# Patient Record
Sex: MALE | Race: WHITE | URBAN - METROPOLITAN AREA
[De-identification: names, ages, dates, MRNs, and addresses within clinical notes are randomized per-mention and may not be internally consistent; named-entity substitution may affect disease eponyms.]

---

## 2018-06-28 ENCOUNTER — INPATIENT (INPATIENT)
Facility: HOSPITAL | Age: 33
LOS: 4 days | Discharge: HOME | End: 2018-07-03
Attending: INTERNAL MEDICINE | Admitting: INTERNAL MEDICINE

## 2018-06-28 VITALS
SYSTOLIC BLOOD PRESSURE: 166 MMHG | WEIGHT: 179.9 LBS | HEART RATE: 82 BPM | DIASTOLIC BLOOD PRESSURE: 88 MMHG | RESPIRATION RATE: 18 BRPM | OXYGEN SATURATION: 98 % | TEMPERATURE: 99 F | HEIGHT: 67 IN

## 2018-06-28 DIAGNOSIS — F10.10 ALCOHOL ABUSE, UNCOMPLICATED: ICD-10-CM

## 2018-06-28 DIAGNOSIS — K85.20 ALCOHOL INDUCED ACUTE PANCREATITIS WITHOUT NECROSIS OR INFECTION: ICD-10-CM

## 2018-06-28 LAB
ALBUMIN SERPL ELPH-MCNC: 4.9 G/DL — SIGNIFICANT CHANGE UP (ref 3.5–5.2)
ALP SERPL-CCNC: 64 U/L — SIGNIFICANT CHANGE UP (ref 30–115)
ALT FLD-CCNC: 33 U/L — SIGNIFICANT CHANGE UP (ref 0–41)
ANION GAP SERPL CALC-SCNC: 18 MMOL/L — HIGH (ref 7–14)
APPEARANCE UR: CLEAR — SIGNIFICANT CHANGE UP
AST SERPL-CCNC: 41 U/L — SIGNIFICANT CHANGE UP (ref 0–41)
BACTERIA # UR AUTO: (no result)
BASOPHILS # BLD AUTO: 0.08 K/UL — SIGNIFICANT CHANGE UP (ref 0–0.2)
BASOPHILS NFR BLD AUTO: 0.5 % — SIGNIFICANT CHANGE UP (ref 0–1)
BILIRUB DIRECT SERPL-MCNC: 0.2 MG/DL — SIGNIFICANT CHANGE UP (ref 0–0.2)
BILIRUB INDIRECT FLD-MCNC: 0.5 MG/DL — SIGNIFICANT CHANGE UP (ref 0.2–1.2)
BILIRUB SERPL-MCNC: 0.7 MG/DL — SIGNIFICANT CHANGE UP (ref 0.2–1.2)
BILIRUB UR-MCNC: (no result)
BUN SERPL-MCNC: 10 MG/DL — SIGNIFICANT CHANGE UP (ref 10–20)
CALCIUM SERPL-MCNC: 9.9 MG/DL — SIGNIFICANT CHANGE UP (ref 8.5–10.1)
CHLORIDE SERPL-SCNC: 98 MMOL/L — SIGNIFICANT CHANGE UP (ref 98–110)
CK MB CFR SERPL CALC: 3.7 NG/ML — SIGNIFICANT CHANGE UP (ref 0.6–6.3)
CK SERPL-CCNC: 245 U/L — HIGH (ref 0–225)
CO2 SERPL-SCNC: 26 MMOL/L — SIGNIFICANT CHANGE UP (ref 17–32)
COLOR SPEC: YELLOW — SIGNIFICANT CHANGE UP
CREAT SERPL-MCNC: 1.3 MG/DL — SIGNIFICANT CHANGE UP (ref 0.7–1.5)
DIFF PNL FLD: NEGATIVE — SIGNIFICANT CHANGE UP
EOSINOPHIL # BLD AUTO: 0.22 K/UL — SIGNIFICANT CHANGE UP (ref 0–0.7)
EOSINOPHIL NFR BLD AUTO: 1.3 % — SIGNIFICANT CHANGE UP (ref 0–8)
GLUCOSE SERPL-MCNC: 98 MG/DL — SIGNIFICANT CHANGE UP (ref 70–99)
GLUCOSE UR QL: NEGATIVE — SIGNIFICANT CHANGE UP
HCT VFR BLD CALC: 47.3 % — SIGNIFICANT CHANGE UP (ref 42–52)
HGB BLD-MCNC: 16.5 G/DL — SIGNIFICANT CHANGE UP (ref 14–18)
IMM GRANULOCYTES NFR BLD AUTO: 0.6 % — HIGH (ref 0.1–0.3)
KETONES UR-MCNC: 80 — SIGNIFICANT CHANGE UP
LACTATE SERPL-SCNC: 1 MMOL/L — SIGNIFICANT CHANGE UP (ref 0.5–2.2)
LEUKOCYTE ESTERASE UR-ACNC: NEGATIVE — SIGNIFICANT CHANGE UP
LIDOCAIN IGE QN: >300 U/L — HIGH (ref 7–60)
LYMPHOCYTES # BLD AUTO: 12 % — LOW (ref 20.5–51.1)
LYMPHOCYTES # BLD AUTO: 2.11 K/UL — SIGNIFICANT CHANGE UP (ref 1.2–3.4)
MAGNESIUM SERPL-MCNC: 2 MG/DL — SIGNIFICANT CHANGE UP (ref 1.8–2.4)
MCHC RBC-ENTMCNC: 31.3 PG — HIGH (ref 27–31)
MCHC RBC-ENTMCNC: 34.9 G/DL — SIGNIFICANT CHANGE UP (ref 32–37)
MCV RBC AUTO: 89.6 FL — SIGNIFICANT CHANGE UP (ref 80–94)
MONOCYTES # BLD AUTO: 1.68 K/UL — HIGH (ref 0.1–0.6)
MONOCYTES NFR BLD AUTO: 9.6 % — HIGH (ref 1.7–9.3)
NEUTROPHILS # BLD AUTO: 13.36 K/UL — HIGH (ref 1.4–6.5)
NEUTROPHILS NFR BLD AUTO: 76 % — HIGH (ref 42.2–75.2)
NITRITE UR-MCNC: NEGATIVE — SIGNIFICANT CHANGE UP
NRBC # BLD: 0 /100 WBCS — SIGNIFICANT CHANGE UP (ref 0–0)
PH UR: 6 — SIGNIFICANT CHANGE UP (ref 5–8)
PLATELET # BLD AUTO: 313 K/UL — SIGNIFICANT CHANGE UP (ref 130–400)
POTASSIUM SERPL-MCNC: 4.2 MMOL/L — SIGNIFICANT CHANGE UP (ref 3.5–5)
POTASSIUM SERPL-SCNC: 4.2 MMOL/L — SIGNIFICANT CHANGE UP (ref 3.5–5)
PROT SERPL-MCNC: 7.4 G/DL — SIGNIFICANT CHANGE UP (ref 6–8)
PROT UR-MCNC: 30
RBC # BLD: 5.28 M/UL — SIGNIFICANT CHANGE UP (ref 4.7–6.1)
RBC # FLD: 13.4 % — SIGNIFICANT CHANGE UP (ref 11.5–14.5)
RBC CASTS # UR COMP ASSIST: SIGNIFICANT CHANGE UP /HPF
SODIUM SERPL-SCNC: 142 MMOL/L — SIGNIFICANT CHANGE UP (ref 135–146)
SP GR SPEC: 1.01 — SIGNIFICANT CHANGE UP (ref 1.01–1.03)
TROPONIN T SERPL-MCNC: <0.01 NG/ML — SIGNIFICANT CHANGE UP
UROBILINOGEN FLD QL: 0.2 — SIGNIFICANT CHANGE UP (ref 0.2–0.2)
WBC # BLD: 17.55 K/UL — HIGH (ref 4.8–10.8)
WBC # FLD AUTO: 17.55 K/UL — HIGH (ref 4.8–10.8)
WBC UR QL: SIGNIFICANT CHANGE UP /HPF

## 2018-06-28 RX ORDER — MORPHINE SULFATE 50 MG/1
6 CAPSULE, EXTENDED RELEASE ORAL ONCE
Qty: 0 | Refills: 0 | Status: DISCONTINUED | OUTPATIENT
Start: 2018-06-28 | End: 2018-06-28

## 2018-06-28 RX ORDER — FOLIC ACID 0.8 MG
1 TABLET ORAL DAILY
Qty: 0 | Refills: 0 | Status: DISCONTINUED | OUTPATIENT
Start: 2018-06-28 | End: 2018-07-03

## 2018-06-28 RX ORDER — THIAMINE MONONITRATE (VIT B1) 100 MG
100 TABLET ORAL DAILY
Qty: 0 | Refills: 0 | Status: DISCONTINUED | OUTPATIENT
Start: 2018-06-29 | End: 2018-07-03

## 2018-06-28 RX ORDER — HEPARIN SODIUM 5000 [USP'U]/ML
5000 INJECTION INTRAVENOUS; SUBCUTANEOUS EVERY 8 HOURS
Qty: 0 | Refills: 0 | Status: DISCONTINUED | OUTPATIENT
Start: 2018-06-28 | End: 2018-07-03

## 2018-06-28 RX ORDER — ONDANSETRON 8 MG/1
4 TABLET, FILM COATED ORAL EVERY 8 HOURS
Qty: 0 | Refills: 0 | Status: DISCONTINUED | OUTPATIENT
Start: 2018-06-28 | End: 2018-07-03

## 2018-06-28 RX ORDER — KETOROLAC TROMETHAMINE 30 MG/ML
30 SYRINGE (ML) INJECTION ONCE
Qty: 0 | Refills: 0 | Status: DISCONTINUED | OUTPATIENT
Start: 2018-06-28 | End: 2018-06-28

## 2018-06-28 RX ORDER — IBUPROFEN 200 MG
400 TABLET ORAL EVERY 6 HOURS
Qty: 0 | Refills: 0 | Status: DISCONTINUED | OUTPATIENT
Start: 2018-06-28 | End: 2018-06-28

## 2018-06-28 RX ORDER — THIAMINE MONONITRATE (VIT B1) 100 MG
100 TABLET ORAL ONCE
Qty: 0 | Refills: 0 | Status: COMPLETED | OUTPATIENT
Start: 2018-06-28 | End: 2018-06-29

## 2018-06-28 RX ORDER — SODIUM CHLORIDE 9 MG/ML
1000 INJECTION INTRAMUSCULAR; INTRAVENOUS; SUBCUTANEOUS
Qty: 0 | Refills: 0 | Status: DISCONTINUED | OUTPATIENT
Start: 2018-06-28 | End: 2018-06-29

## 2018-06-28 RX ORDER — HYDROMORPHONE HYDROCHLORIDE 2 MG/ML
1 INJECTION INTRAMUSCULAR; INTRAVENOUS; SUBCUTANEOUS
Qty: 0 | Refills: 0 | Status: DISCONTINUED | OUTPATIENT
Start: 2018-06-28 | End: 2018-07-03

## 2018-06-28 RX ORDER — MORPHINE SULFATE 50 MG/1
4 CAPSULE, EXTENDED RELEASE ORAL EVERY 4 HOURS
Qty: 0 | Refills: 0 | Status: DISCONTINUED | OUTPATIENT
Start: 2018-06-28 | End: 2018-06-28

## 2018-06-28 RX ORDER — ONDANSETRON 8 MG/1
4 TABLET, FILM COATED ORAL ONCE
Qty: 0 | Refills: 0 | Status: COMPLETED | OUTPATIENT
Start: 2018-06-28 | End: 2018-06-28

## 2018-06-28 RX ORDER — HYDROMORPHONE HYDROCHLORIDE 2 MG/ML
1 INJECTION INTRAMUSCULAR; INTRAVENOUS; SUBCUTANEOUS ONCE
Qty: 0 | Refills: 0 | Status: DISCONTINUED | OUTPATIENT
Start: 2018-06-28 | End: 2018-06-28

## 2018-06-28 RX ORDER — MORPHINE SULFATE 50 MG/1
2 CAPSULE, EXTENDED RELEASE ORAL EVERY 6 HOURS
Qty: 0 | Refills: 0 | Status: DISCONTINUED | OUTPATIENT
Start: 2018-06-28 | End: 2018-06-28

## 2018-06-28 RX ORDER — HYDROMORPHONE HYDROCHLORIDE 2 MG/ML
0.5 INJECTION INTRAMUSCULAR; INTRAVENOUS; SUBCUTANEOUS ONCE
Qty: 0 | Refills: 0 | Status: DISCONTINUED | OUTPATIENT
Start: 2018-06-28 | End: 2018-06-28

## 2018-06-28 RX ORDER — SODIUM CHLORIDE 9 MG/ML
1000 INJECTION INTRAMUSCULAR; INTRAVENOUS; SUBCUTANEOUS
Qty: 0 | Refills: 0 | Status: DISCONTINUED | OUTPATIENT
Start: 2018-06-28 | End: 2018-06-28

## 2018-06-28 RX ORDER — SODIUM CHLORIDE 9 MG/ML
2000 INJECTION INTRAMUSCULAR; INTRAVENOUS; SUBCUTANEOUS ONCE
Qty: 0 | Refills: 0 | Status: COMPLETED | OUTPATIENT
Start: 2018-06-28 | End: 2018-06-28

## 2018-06-28 RX ADMIN — MORPHINE SULFATE 4 MILLIGRAM(S): 50 CAPSULE, EXTENDED RELEASE ORAL at 13:58

## 2018-06-28 RX ADMIN — HYDROMORPHONE HYDROCHLORIDE 1 MILLIGRAM(S): 2 INJECTION INTRAMUSCULAR; INTRAVENOUS; SUBCUTANEOUS at 10:46

## 2018-06-28 RX ADMIN — HYDROMORPHONE HYDROCHLORIDE 1 MILLIGRAM(S): 2 INJECTION INTRAMUSCULAR; INTRAVENOUS; SUBCUTANEOUS at 22:42

## 2018-06-28 RX ADMIN — HYDROMORPHONE HYDROCHLORIDE 1 MILLIGRAM(S): 2 INJECTION INTRAMUSCULAR; INTRAVENOUS; SUBCUTANEOUS at 19:40

## 2018-06-28 RX ADMIN — HYDROMORPHONE HYDROCHLORIDE 0.5 MILLIGRAM(S): 2 INJECTION INTRAMUSCULAR; INTRAVENOUS; SUBCUTANEOUS at 09:36

## 2018-06-28 RX ADMIN — ONDANSETRON 4 MILLIGRAM(S): 8 TABLET, FILM COATED ORAL at 08:21

## 2018-06-28 RX ADMIN — SODIUM CHLORIDE 200 MILLILITER(S): 9 INJECTION INTRAMUSCULAR; INTRAVENOUS; SUBCUTANEOUS at 14:56

## 2018-06-28 RX ADMIN — HYDROMORPHONE HYDROCHLORIDE 1 MILLIGRAM(S): 2 INJECTION INTRAMUSCULAR; INTRAVENOUS; SUBCUTANEOUS at 23:00

## 2018-06-28 RX ADMIN — MORPHINE SULFATE 2 MILLIGRAM(S): 50 CAPSULE, EXTENDED RELEASE ORAL at 12:51

## 2018-06-28 RX ADMIN — HYDROMORPHONE HYDROCHLORIDE 0.5 MILLIGRAM(S): 2 INJECTION INTRAMUSCULAR; INTRAVENOUS; SUBCUTANEOUS at 10:50

## 2018-06-28 RX ADMIN — Medication 30 MILLIGRAM(S): at 08:21

## 2018-06-28 RX ADMIN — ONDANSETRON 4 MILLIGRAM(S): 8 TABLET, FILM COATED ORAL at 22:42

## 2018-06-28 RX ADMIN — HYDROMORPHONE HYDROCHLORIDE 1 MILLIGRAM(S): 2 INJECTION INTRAMUSCULAR; INTRAVENOUS; SUBCUTANEOUS at 16:04

## 2018-06-28 RX ADMIN — MORPHINE SULFATE 4 MILLIGRAM(S): 50 CAPSULE, EXTENDED RELEASE ORAL at 14:50

## 2018-06-28 RX ADMIN — Medication 30 MILLIGRAM(S): at 09:22

## 2018-06-28 RX ADMIN — Medication 0.5 MILLIGRAM(S): at 10:50

## 2018-06-28 RX ADMIN — HYDROMORPHONE HYDROCHLORIDE 1 MILLIGRAM(S): 2 INJECTION INTRAMUSCULAR; INTRAVENOUS; SUBCUTANEOUS at 16:30

## 2018-06-28 RX ADMIN — Medication 2 MILLIGRAM(S): at 21:25

## 2018-06-28 RX ADMIN — SODIUM CHLORIDE 1000 MILLILITER(S): 9 INJECTION INTRAMUSCULAR; INTRAVENOUS; SUBCUTANEOUS at 08:22

## 2018-06-28 RX ADMIN — Medication 2 MILLIGRAM(S): at 17:29

## 2018-06-28 RX ADMIN — HYDROMORPHONE HYDROCHLORIDE 1 MILLIGRAM(S): 2 INJECTION INTRAMUSCULAR; INTRAVENOUS; SUBCUTANEOUS at 19:55

## 2018-06-28 RX ADMIN — MORPHINE SULFATE 2 MILLIGRAM(S): 50 CAPSULE, EXTENDED RELEASE ORAL at 13:32

## 2018-06-28 RX ADMIN — SODIUM CHLORIDE 250 MILLILITER(S): 9 INJECTION INTRAMUSCULAR; INTRAVENOUS; SUBCUTANEOUS at 22:42

## 2018-06-28 NOTE — H&P ADULT - NSHPREVIEWOFSYSTEMS_GEN_ALL_CORE
no fever os chills or cough  abd pain no diarrhea  no chest pain or shortness of breath  no other concerns

## 2018-06-28 NOTE — CONSULT NOTE ADULT - ASSESSMENT
1. Pancreatitis   - GI fu   - surgical fu   - NPO except clears   - c/w dilaudid it helps pt   - c/w ativan   - thiamine folate for etoh abuse   - monitor for DTs   - would consider antibiotics if wbc increasing   - DVT ppx     family at bedside

## 2018-06-28 NOTE — H&P ADULT - NSHPLABSRESULTS_GEN_ALL_CORE
16.5   17.55 )-----------( 313      ( 2018 08:12 )             47.3           142  |  98  |  10  ----------------------------<  98  4.2   |  26  |  1.3    Ca    9.9      2018 08:12    TPro  7.4  /  Alb  4.9  /  TBili  0.7  /  DBili  0.2  /  AST  41  /  ALT  33  /  AlkPhos  64                    Urinalysis Basic - ( 2018 08:12 )    Color: Yellow / Appearance: Clear / S.015 / pH: x  Gluc: x / Ketone: 80  / Bili: Small / Urobili: 0.2   Blood: x / Protein: 30 / Nitrite: Negative   Leuk Esterase: Negative / RBC: 1-2 /HPF / WBC 1-2 /HPF   Sq Epi: x / Non Sq Epi: x / Bacteria: Few            Lactate Trend   @ 08:12 Lactate:1.0       CARDIAC MARKERS ( 2018 08:12 )  x     / <0.01 ng/mL / 245 U/L / x     / x      CARDIAC MARKERS ( 2018 08:00 )  x     / x     / x     / x     / 3.7 ng/mL

## 2018-06-28 NOTE — H&P ADULT - ASSESSMENT
DX; abdominal pain        pancreatitis        alcohol abuse  A/P;admit to med-surg       NPO       IV HYDRATION  labs/ecg/c-xray  RUQ US  pain mgmt  GI consult  Detox consult  Librium prn  monitor vss  monitor pt

## 2018-06-28 NOTE — ED PROVIDER NOTE - PHYSICAL EXAMINATION
VITAL SIGNS: I have reviewed nursing notes and confirm.  CONSTITUTIONAL: Well-developed; well-nourished; in no acute distress. pt very uncomfortable  SKIN: skin exam is warm and dry, no acute rash.   HEAD: Normocephalic; atraumatic.  EYES:  EOM intact; conjunctiva and sclera clear.  ENT: No nasal discharge; airway clear. moist oral mucosa;   NECK: Supple; non tender.  CARD: S1, S2 normal; no murmurs, gallops, or rubs. Regular rate and rhythm. posterior tibial and radial pulses 2+  RESP: No wheezes, rales or rhonchi. cta b/l. no use of accessory muscles. no retractions  ABD: Normal bowel sounds; soft; non-distended; tenderness to abdomen throughout-- concentrated to epigastric area.   EXT: Normal ROM. No  cyanosis or edema.  BACK: No cva tenderness  LYMPH: No acute cervical adenopathy.  NEURO: Alert, oriented, grossly unremarkable.    PSYCH: Cooperative, appropriate.

## 2018-06-28 NOTE — H&P ADULT - HISTORY OF PRESENT ILLNESS
32y old male pmhx below presents to the ED complaining of sudden onset of constant sharp like epigastric abdominal pain scale 10/10 since yesterday radiating to the back and associated with n/v x1 today. pain midly relieved with aleve. pt denies chest pain, sob, headache, fever/ chills, blackouts or seizures. pt admits to current and continous use of alcohol - 2pints vodka/day x5yrs. last use yesterday. pt also states hx of opiods abuse - oxycodone pills. last use 10yrs ago. pt currently on suboxone

## 2018-06-28 NOTE — H&P ADULT - NSHPSOCIALHISTORY_GEN_ALL_CORE
lives at home  has 6 year gorlfried  + etoh  + smoking  + hx of oxycodone but no recent. o n suboxone

## 2018-06-28 NOTE — H&P ADULT - ATTENDING COMMENTS
31 yo male with pmh that includes continuous alcohol dependence, continuous opiate dependence, last admitted in 2014 for alcoholic pancreatitis, who presented with abdominal pain. Sts drinks daily, has hx of 'the shakes' when does not drink, and developed abdominal pain yesterday, epigastric and radiating to the back. thus came to the ER today. He was assessed in the ER and found with uncontrolled HTN, leukocytosis, no fevers, a CT showed acute pancreatitis and a RUQ sono did not show gallstone. admitted for further managment    On the unit, he states his pain is not controlled with the morphie,m but the dilaudid controlled t well bedfore. He is much more comfortable after 1 mg iv dilaudid.     His CIWA score after the dilaudid is    above info from PA reviewed with patient and his grlfriend Alexa at the bedside and modified prn    PLAN ADDENDED AS BELOW:    # Acute pancreatitis, without necrosis. with leukocytosi but no fever  - NPO  - IVF  ml /hr      Cr is mildly elevated    31 yo male with pmh that includes continuous alcohol dependence, continuous opiate dependence, last admitted in 2014 for alcoholic pancreatitis, who presented with abdominal pain. Sts drinks daily, has hx of 'the shakes' when does not drink, and developed abdominal pain yesterday, epigastric and radiating to the back. thus came to the ER today. He was assessed in the ER and found with uncontrolled HTN, leukocytosis, no fevers, a CT showed acute pancreatitis and a RUQ sono did not show gallstone. admitted for further managment    On the unit, he states his pain is not controlled with the morphie,m but the dilaudid controlled t well bedfore. He is much more comfortable after 1 mg iv dilaudid.     His CIWA score after the dilaudid is        · Assessment	  DX; abdominal pain        pancreatitis        alcohol abuse  A/P;admit to med-surg       NPO       IV HYDRATION  labs/ecg/c-xray  RUQ US  pain mgmt  GI consult  Detox consult  Librium prn  monitor vss  monitor pt       Attending Statement:  Plan discussed with DR STALLINGS 31 yo male with pmh that includes continuous alcohol dependence, continuous opiate dependence, last admitted in 2014 for alcoholic pancreatitis, who presented with abdominal pain. Sts drinks daily, has hx of 'the shakes' when does not drink, and developed abdominal pain yesterday, epigastric and radiating to the back. thus came to the ER today. He was assessed in the ER and found with uncontrolled HTN, leukocytosis, no fevers, a CT showed acute pancreatitis and a RUQ sono did not show gallstone. admitted for further managment    On the unit, he states his pain is not controlled with the morphie,m but the dilaudid controlled t well bedfore. He is much more comfortable after 1 mg iv dilaudid.     His CIWA score after the dilaudid is    above info from PA reviewed with patient and his grlfriend Alexa at the bedside and modified prn    PLAN ADDENDED AS BELOW:    # Acute pancreatitis, without necrosis. with leukocytosi but no fever  - NPO  - IVF  ml /hr  - trend vitals frequently, leukocytosi  - GI will see as well  - dilaudid seems to have worked better than morphine, rx dilaudid 1mg Iv q4 hrs prn and titrate as needed  - hold home suboxone for now while needs iv opiates      # Continuous alcohol dependence: when arrived to floor was restless. pain vs withdrawal. morhine did not help but dilaidid did.   ---- CIWA score after the dilaudid is  ---- Rx  - addiction medicine consult      # Cr is mildly elevated: trend with IVF likely mild prerenal VALERIE 31 yo male with pmh that includes continuous alcohol dependence, continuous opiate dependence, last admitted in 2014 for alcoholic pancreatitis, who presented with abdominal pain. Sts drinks daily, has hx of 'the shakes' when does not drink, and developed abdominal pain yesterday, epigastric and radiating to the back. thus came to the ER today. He was assessed in the ER and found with uncontrolled HTN, leukocytosis, no fevers, a CT showed acute pancreatitis and a RUQ sono did not show gallstone. admitted for further management    On the unit, he states his pain is not controlled with the morphine,m but the dilaudid controlled t well bedfore. He is much more comfortable after 1 mg iv dilaudid.     His CIWA score after the dilaudid is 9    on exam he is restless. no rsp distress. s1 s2 RRR. CTA BL lungs. abd soft tender epigastrically + BS. E no edema BLE    above info from PA reviewed with patient and his girlfriend Alexa at the bedside and modified prn    PLAN ADDENDED AS BELOW:    # Acute pancreatitis, without necrosis. with leukocytosis but no fever  - NPO  - IVF  ml /hr  - strict i/o  - trend vitals frequently, leukocytosis  - seen by GI who agreed with management but recommneded ICU screen, will call intensivist  - dilaudid seems to have worked better than morphine, rx dilaudid 1mg Iv q3 hrs prn and titrate as needed  - hold home suboxone for now while needs iv opiates      # Continuous alcohol dependence: when arrived to floor was restless. pain vs withdrawal. morhine did not help but dilaidid did.   - CIWA score after the dilaudid is still elevated at 9  - d/w detox MD, will start ativan protocol  - addiction medicine consult to woody follow      # Cr is mildly elevated: trend with IVF likely mild prerenal VALERIE    # HSQ ppx    # plan reviewer with patient, ximena, ej, rn

## 2018-06-28 NOTE — CONSULT NOTE ADULT - SUBJECTIVE AND OBJECTIVE BOX
This is a   admitted with  PANCREATITIS    for whom theGastroenterology Service is  consulted for :  33 year old male patient with known history of alcohol abuse, hx of acute pancreatitis admitted with pain and vomiting x 1 day duration. had been drinking alcohol till day prior          Current Health Issues  PANCREATITIS  ^PANCREATITIS  No pertinent family history in first degree relatives  Handoff  MEWS Score  ETOH abuse  Pancreatitis  No significant past surgical history  ABD PAIN (PANCREATITIS)  90+  ETOH abuse          No Known Allergies          Home Medications:  Suboxone: 3 milligram(s) sublingual (28 Jun 2018 14:38)          FAMILY HISTORY:  No pertinent family history in first degree relatives      REVIEW OF SYSTEMS      General: Nil	    Skin/Breast:  	  Ophthalmologic:  	  ENMT:	    Respiratory and Thorax:Nil	    	  Cardiovascular:	Nil	      Gastrointestinal:	    Genitourinary:	    Musculoskeletal:	Nil	      Neurological:	    Psychiatric:	    Hematology/Lymphatics:	    Endocrine:	    Allergic/Immunologic:	      PHYSICAL EXAM:    GENERAL:   in no distress    T(C): 36.9 (06-28-18 @ 16:35), Max: 37.1 (06-28-18 @ 07:52)  HR: 76 (06-28-18 @ 16:35) (67 - 82)  BP: 159/78 (06-28-18 @ 16:35) (153/100 - 177/93)  RR: 18 (06-28-18 @ 16:35) (18 - 22)  SpO2: 100% (06-28-18 @ 12:48) (97% - 100%)      HEENT:  NC/AT,  anicteric  CHEST:   no increased effort, breath sounds clear  HEART:  Regular rate and  rhythm  ABDOMEN:  Soft, but tender, non-distended, normoactive bowel sounds,  no masses ,no hepato-splenomegaly, no signs of chronic liver disease  EXTREMETIES :  no clubbing cyanosis or edema    MICROBIOLOGY      DATA  CBC Full  -  ( 28 Jun 2018 08:12 )  WBC Count : 17.55 K/uL  Hemoglobin : 16.5 g/dL  Hematocrit : 47.3 %  Platelet Count - Automated : 313 K/uL  Mean Cell Volume : 89.6 fL  Mean Cell Hemoglobin : 31.3 pg  Mean Cell Hemoglobin Concentration : 34.9 g/dL  Auto Neutrophil # : 13.36 K/uL  Auto Lymphocyte # : 2.11 K/uL  Auto Monocyte # : 1.68 K/uL  Auto Eosinophil # : 0.22 K/uL  Auto Basophil # : 0.08 K/uL  Auto Neutrophil % : 76.0 %  Auto Lymphocyte % : 12.0 %  Auto Monocyte % : 9.6 %  Auto Eosinophil % : 1.3 %  Auto Basophil % : 0.5 %          06-28  <  142  |  98  |  10  ----------------------------<  98  4.2   |  26  |  1.3    Ca    9.9      28 Jun 2018 08:12    TPro  7.4  /  Alb  4.9  /  TBili  0.7  /  DBili  0.2  /  AST  41  /  ALT  33  /  AlkPhos  64  06-28              AMYLASE                  06-28 @ 08:12   --  LIPASE                   06-28 @ 08:12  904  HCG  --                    06-28 @ 08:12     from: CT Abdomen and Pelvis w/ IV Cont (06.28.18 @ 09:02) >  Extensive peripancreatic fat stranding and fluid compatible   with pancreatitis. No peripancreatic fluid collections. Normal pancreatic   enhancement. Patent splenic vein.      < end of copied text >  < from: US Abdomen Limited (06.28.18 @ 12:39) >  GALLBLADDER/BILIARY TREE: There are no gallstones. There is no   gallbladder wall thickening. There is no pericholecystic fluid. The   common duct measures 6 mm.     < end of copied text >

## 2018-06-28 NOTE — CONSULT NOTE ADULT - SUBJECTIVE AND OBJECTIVE BOX
32 year old man with pancreatitis     pt seen and examined at bedside, pt resting comfortably with family asking to drink a carbonated bevarge.     PE vss  general: awake   chest: cta b/l  cvs: s1s2+  abd: diffuse tenderness  ext: no edema

## 2018-06-28 NOTE — H&P ADULT - NSHPPHYSICALEXAM_GEN_ALL_CORE
PHYSICAL EXAM:  GENERAL: NAD, well-developed, well nourished, looks stated age  HEAD:  Atraumatic, Normocephalic  EYES: EOMI, PERRLA, conjunctiva and sclera clear  NECK: Supple, No JVD  CHEST/LUNG: Clear to auscultation bilaterally  HEART: S1,S2 Regular rate and rhythm  ABDOMEN: Soft, distended, epigastric tenderness; Bowel sounds present and normoactive  EXTREMITIES:  2+ peripheral pulses bilaterally and symmetrically, no clubbing, cyanosis, or edema  PSYCH: AAOx3, normal mood and affect  NEUROLOGY: non-focal, muscle strength 5/5 all extremities  SKIN: No rashes or lesions

## 2018-06-28 NOTE — ED PROVIDER NOTE - ATTENDING CONTRIBUTION TO CARE
I personally evaluated the patient. I reviewed the Resident’s or Physician Assistant’s note (as assigned above), and agree with the findings and plan except as documented in my note.  32y male alcoholic in opiate recovery x 10 years on suboxone with abd pain similar to prior episode of alcoholic pancreatitis, no fever, on exam vital signs appreciated, nontoxic, in pain, abd +bs, soft, mild epi ttp no g/r, nondistended, labs and studies reviewed, will admit for bowel rest, pain control, stable for floor

## 2018-06-28 NOTE — ED PROVIDER NOTE - NS ED ROS FT
ROS: No fever/chills, No headache/photophobia/eye pain/changes in vision, No ear pain/sore throat/dysphagia, No chest pain/palpitations, no SOB/cough/wheeze/stridor,  No V/D/melena, no dysuria/frequency/discharge, No neck/back pain, no rash, no changes in neurological status/function.    +abdominal pain, radiating to back; nausea ROS: No fever/chills, No headache/photophobia/eye pain/changes in vision, No ear pain/sore throat/dysphagia, No chest pain/palpitations, no SOB/cough/wheeze/stridor,  No V/D/melena, no dysuria/frequency/discharge, No neck/back pain, no rash, no changes in neurological status/function. denies testicular pain    +abdominal pain, radiating to back; nausea

## 2018-06-28 NOTE — ED PROVIDER NOTE - OBJECTIVE STATEMENT
31y/o M w/ hx of substance abuse- now on subaxone, hx of alcohol abuse (vodka 1l a week, last drink last night; pt has never had seizures for withdrawals), hx of pancreatitis-no hx of abscesses on pancreas presents for "pancreatitis.  pt with epigastric and periumbilical abdominal pain radiating to the back.  pt with nausea. no fevers, no diarrhea or vomiting.

## 2018-06-29 LAB
ALBUMIN SERPL ELPH-MCNC: 3.9 G/DL — SIGNIFICANT CHANGE UP (ref 3.5–5.2)
ALP SERPL-CCNC: 46 U/L — SIGNIFICANT CHANGE UP (ref 30–115)
ALT FLD-CCNC: 22 U/L — SIGNIFICANT CHANGE UP (ref 0–41)
AMYLASE P1 CFR SERPL: 650 U/L — CRITICAL HIGH (ref 25–115)
ANION GAP SERPL CALC-SCNC: 15 MMOL/L — HIGH (ref 7–14)
AST SERPL-CCNC: 33 U/L — SIGNIFICANT CHANGE UP (ref 0–41)
BILIRUB SERPL-MCNC: 0.6 MG/DL — SIGNIFICANT CHANGE UP (ref 0.2–1.2)
BUN SERPL-MCNC: 5 MG/DL — LOW (ref 10–20)
CALCIUM SERPL-MCNC: 8.6 MG/DL — SIGNIFICANT CHANGE UP (ref 8.5–10.1)
CHLORIDE SERPL-SCNC: 98 MMOL/L — SIGNIFICANT CHANGE UP (ref 98–110)
CO2 SERPL-SCNC: 25 MMOL/L — SIGNIFICANT CHANGE UP (ref 17–32)
CREAT SERPL-MCNC: 0.8 MG/DL — SIGNIFICANT CHANGE UP (ref 0.7–1.5)
GLUCOSE SERPL-MCNC: 94 MG/DL — SIGNIFICANT CHANGE UP (ref 70–99)
HCT VFR BLD CALC: 42.4 % — SIGNIFICANT CHANGE UP (ref 42–52)
HGB BLD-MCNC: 14.7 G/DL — SIGNIFICANT CHANGE UP (ref 14–18)
LIDOCAIN IGE QN: >300 U/L — HIGH (ref 7–60)
MAGNESIUM SERPL-MCNC: 1.7 MG/DL — LOW (ref 1.8–2.4)
MCHC RBC-ENTMCNC: 31.2 PG — HIGH (ref 27–31)
MCHC RBC-ENTMCNC: 34.7 G/DL — SIGNIFICANT CHANGE UP (ref 32–37)
MCV RBC AUTO: 90 FL — SIGNIFICANT CHANGE UP (ref 80–94)
NRBC # BLD: 0 /100 WBCS — SIGNIFICANT CHANGE UP (ref 0–0)
PLATELET # BLD AUTO: 226 K/UL — SIGNIFICANT CHANGE UP (ref 130–400)
POTASSIUM SERPL-MCNC: 4.2 MMOL/L — SIGNIFICANT CHANGE UP (ref 3.5–5)
POTASSIUM SERPL-SCNC: 4.2 MMOL/L — SIGNIFICANT CHANGE UP (ref 3.5–5)
PROT SERPL-MCNC: 5.9 G/DL — LOW (ref 6–8)
RBC # BLD: 4.71 M/UL — SIGNIFICANT CHANGE UP (ref 4.7–6.1)
RBC # FLD: 13.2 % — SIGNIFICANT CHANGE UP (ref 11.5–14.5)
SODIUM SERPL-SCNC: 138 MMOL/L — SIGNIFICANT CHANGE UP (ref 135–146)
WBC # BLD: 18.81 K/UL — HIGH (ref 4.8–10.8)
WBC # FLD AUTO: 18.81 K/UL — HIGH (ref 4.8–10.8)

## 2018-06-29 RX ORDER — MAGNESIUM SULFATE 500 MG/ML
2 VIAL (ML) INJECTION ONCE
Qty: 0 | Refills: 0 | Status: COMPLETED | OUTPATIENT
Start: 2018-06-29 | End: 2018-06-29

## 2018-06-29 RX ORDER — SODIUM CHLORIDE 9 MG/ML
1000 INJECTION INTRAMUSCULAR; INTRAVENOUS; SUBCUTANEOUS
Qty: 0 | Refills: 0 | Status: DISCONTINUED | OUTPATIENT
Start: 2018-06-29 | End: 2018-06-29

## 2018-06-29 RX ORDER — SODIUM CHLORIDE 9 MG/ML
1000 INJECTION, SOLUTION INTRAVENOUS
Qty: 0 | Refills: 0 | Status: DISCONTINUED | OUTPATIENT
Start: 2018-06-29 | End: 2018-06-30

## 2018-06-29 RX ORDER — KETOROLAC TROMETHAMINE 30 MG/ML
30 SYRINGE (ML) INJECTION EVERY 6 HOURS
Qty: 0 | Refills: 0 | Status: DISCONTINUED | OUTPATIENT
Start: 2018-06-29 | End: 2018-06-30

## 2018-06-29 RX ADMIN — SODIUM CHLORIDE 150 MILLILITER(S): 9 INJECTION INTRAMUSCULAR; INTRAVENOUS; SUBCUTANEOUS at 09:44

## 2018-06-29 RX ADMIN — Medication 2 MILLIGRAM(S): at 06:00

## 2018-06-29 RX ADMIN — Medication 2 MILLIGRAM(S): at 14:12

## 2018-06-29 RX ADMIN — SODIUM CHLORIDE 150 MILLILITER(S): 9 INJECTION, SOLUTION INTRAVENOUS at 12:03

## 2018-06-29 RX ADMIN — Medication 30 MILLIGRAM(S): at 15:54

## 2018-06-29 RX ADMIN — Medication 100 MILLIGRAM(S): at 06:03

## 2018-06-29 RX ADMIN — HYDROMORPHONE HYDROCHLORIDE 1 MILLIGRAM(S): 2 INJECTION INTRAMUSCULAR; INTRAVENOUS; SUBCUTANEOUS at 10:11

## 2018-06-29 RX ADMIN — HYDROMORPHONE HYDROCHLORIDE 1 MILLIGRAM(S): 2 INJECTION INTRAMUSCULAR; INTRAVENOUS; SUBCUTANEOUS at 09:41

## 2018-06-29 RX ADMIN — Medication 2 MILLIGRAM(S): at 02:20

## 2018-06-29 RX ADMIN — Medication 30 MILLIGRAM(S): at 20:26

## 2018-06-29 RX ADMIN — Medication 1.5 MILLIGRAM(S): at 22:10

## 2018-06-29 RX ADMIN — Medication 2 MILLIGRAM(S): at 09:41

## 2018-06-29 RX ADMIN — HYDROMORPHONE HYDROCHLORIDE 1 MILLIGRAM(S): 2 INJECTION INTRAMUSCULAR; INTRAVENOUS; SUBCUTANEOUS at 22:10

## 2018-06-29 RX ADMIN — HYDROMORPHONE HYDROCHLORIDE 1 MILLIGRAM(S): 2 INJECTION INTRAMUSCULAR; INTRAVENOUS; SUBCUTANEOUS at 02:20

## 2018-06-29 RX ADMIN — HYDROMORPHONE HYDROCHLORIDE 1 MILLIGRAM(S): 2 INJECTION INTRAMUSCULAR; INTRAVENOUS; SUBCUTANEOUS at 15:54

## 2018-06-29 RX ADMIN — HYDROMORPHONE HYDROCHLORIDE 1 MILLIGRAM(S): 2 INJECTION INTRAMUSCULAR; INTRAVENOUS; SUBCUTANEOUS at 18:36

## 2018-06-29 RX ADMIN — HYDROMORPHONE HYDROCHLORIDE 1 MILLIGRAM(S): 2 INJECTION INTRAMUSCULAR; INTRAVENOUS; SUBCUTANEOUS at 15:24

## 2018-06-29 RX ADMIN — HYDROMORPHONE HYDROCHLORIDE 1 MILLIGRAM(S): 2 INJECTION INTRAMUSCULAR; INTRAVENOUS; SUBCUTANEOUS at 06:00

## 2018-06-29 RX ADMIN — SODIUM CHLORIDE 250 MILLILITER(S): 9 INJECTION INTRAMUSCULAR; INTRAVENOUS; SUBCUTANEOUS at 06:05

## 2018-06-29 RX ADMIN — HYDROMORPHONE HYDROCHLORIDE 1 MILLIGRAM(S): 2 INJECTION INTRAMUSCULAR; INTRAVENOUS; SUBCUTANEOUS at 02:37

## 2018-06-29 RX ADMIN — Medication 1 MILLIGRAM(S): at 11:12

## 2018-06-29 RX ADMIN — Medication 30 MILLIGRAM(S): at 14:09

## 2018-06-29 RX ADMIN — HYDROMORPHONE HYDROCHLORIDE 1 MILLIGRAM(S): 2 INJECTION INTRAMUSCULAR; INTRAVENOUS; SUBCUTANEOUS at 06:15

## 2018-06-29 RX ADMIN — Medication 50 GRAM(S): at 22:09

## 2018-06-29 RX ADMIN — HYDROMORPHONE HYDROCHLORIDE 1 MILLIGRAM(S): 2 INJECTION INTRAMUSCULAR; INTRAVENOUS; SUBCUTANEOUS at 18:58

## 2018-06-29 RX ADMIN — HYDROMORPHONE HYDROCHLORIDE 1 MILLIGRAM(S): 2 INJECTION INTRAMUSCULAR; INTRAVENOUS; SUBCUTANEOUS at 23:44

## 2018-06-29 RX ADMIN — Medication 1.5 MILLIGRAM(S): at 17:45

## 2018-06-29 RX ADMIN — Medication 100 MILLIGRAM(S): at 11:12

## 2018-06-29 RX ADMIN — Medication 30 MILLIGRAM(S): at 22:04

## 2018-06-29 NOTE — PROGRESS NOTE ADULT - ASSESSMENT
31y/o M w/ hx of substance abuse- now on subaxone, hx of alcohol abuse (vodka 1l a week, last drink last night; pt has never had seizures for withdrawals), hx of pancreatitis-no hx of abscesses on pancreas presents for "pancreatitis.  pt with epigastric and periumbilical abdominal pain radiating to the back.  pt with nausea. no fevers, no diarrhea or vomiting.    Patient with acute alcoholic pancreatitis   1) NPO  2) Pain control  [id number] year old male patient with known history of alcohol abuse, hx of acute pancr.    32y old male pmhx below presents to the ED complaining of sudden onset of constant sharp like epigastric abdominal pain scale 10/10 since yesterday radiating to the back and associated with n/v x1 today. pain midly relieved with aleve. pt denies chest pain, sob, headache, fever/ chills, blackouts or seizures. pt admits to current and continous use of alcohol - 2pints vodka/day x5yrs. last use yesterday. pt also states hx of opiods abuse - oxycodone pills. last use 10yrs ago. pt currently on suboxone.    DX; abdominal pain        pancreatitis        alcohol abuse  A/P;admit to med-surg       NPO       IV HYDRATION  labs/ecg/c-xray  RUQ US  pain mgmt  GI consult  Detox consult  Librium prn  monitor vss  monitor pt      Attending Statement:  31 yo male with pmh that includes continuous alcohol dependence, continuous opiate dependence, last admitted in 2014 for alcoholic pancreatitis, who presented with abdominal pain. Sts drinks daily, has hx of 'the shakes' when does not drink, and developed abdominal pain yesterday, epigastric and radiating to the back. thus came to the ER today. He was assessed in the ER and found with uncontrolled HTN, leukocytosis, no fevers, a CT showed acute pancreatitis and a RUQ sono did not show gallstone. admitted for further management    On the unit, he states his pain is not controlled with the morphine,m but the dilaudid controlled t well bedfore. He is much more comfortable after 1 mg iv dilaudid.     His CIWA score after the dilaudid is 9    on exam he is restless. no rsp distress. s1 s2 RRR. CTA BL lungs. abd soft tender epigastrically + BS. E no edema BLE    above info from PA reviewed with patient and his girlfriend Alexa at the bedside and modified prn    PLAN ADDENDED AS BELOW:    # Acute pancreatitis, without necrosis. with leukocytosis but no fever  - NPO  - IVF  ml /hr  - strict i/o  - trend vitals frequently, leukocytosis  - seen by GI who agreed with management but recommneded ICU screen, will call intensivist  - dilaudid seems to have worked better than morphine, rx dilaudid 1mg Iv q3 hrs prn and titrate as needed  - hold home suboxone for now while needs iv opiates      # Continuous alcohol dependence: when arrived to floor was restless. pain vs withdrawal. morhine did not help but dilaidid did.   - CIWA score after the dilaudid is still elevated at 9  - d/w detox MD, will start ativan protocol  - addiction medicine consult to fomally follow  ETOH  Opioid abuse    Place on ativan taper  hold suboxone for now  pain meds as needed  Pt not interested in any detox or program  He can attend intakes outpt program if he wants after discharge         # Cr is mildly elevated: trend with IVF likely mild prerenal VALERIE    # HSQ ppx    # plan reviewer with patient, girlfried, ej, rn .    Plan discussed with DR GIL. 31y/o M with hx of opioid  abuse- now on Suboxone, hx of alcohol abuse with prior Inpatient detox, hx of Alcoholic pancreatitis (2014) presents with complaints of sudden onset, severe 10/10, sharp epigastric and periumbilical abdominal pain radiating to the back; associated with nausea but no fevers, diarrhea or vomiting. Patient has been drinking  2 pints of vodka/day for the past 5yrs. Last use a day PTP.  In the ER he was found with uncontrolled HTN, leukocytosis, no fevers, a CT showed acute pancreatitis. He was admitted for further management    .  Assessment and Plan:    1. Acute Alcoholic Pancreatitis: without necrosis  GI following:   Keep NPO, Continue IV fluids and Pain control.  Monitor BMP and Electrolytes.  RUQ Sonogram showed no Gallstones.       2. Alcohol abuse:  Continue Thiamine, Folate, MVI.  Detox following: Started on Ativan protocol for, Hold Suboxone for now.  Dr. Woods discussed with Patient but he is not interested in any detox program. He recommended an outpatient program after discharge  .  Monitor Magnesium Level.      3. Worsening Leukocytosis:  Most likely Reactive.   Monitor closely.           DVT prophylaxis: Heparin  Disposition: Home when stable.

## 2018-06-29 NOTE — CONSULT NOTE ADULT - SUBJECTIVE AND OBJECTIVE BOX
DETOX CONSULT:      Pt interviewed, examined and EMR chart reviewed.  admitted with acute pancreatitis  Hx of ETOH abuse, has been drinking for ___FEW__ years/months  variable periods of sobriety in the past.  Has been in detox before __x___yes,   _____No  was clean for 4 years  relapsed recently  On suboxone for 3 yrs also  c/o w/d  SOCIAL HISTORY: etoh        CMEDICATIONS  (STANDING):  folic acid 1 milliGRAM(s) Oral daily  heparin  Injectable 5000 Unit(s) SubCutaneous every 8 hours  LORazepam     Tablet 2 milliGRAM(s) Oral every 4 hours  LORazepam     Tablet 1.5 milliGRAM(s) Oral every 4 hours  LORazepam     Tablet   Oral   sodium chloride 0.9%. 1000 milliLiter(s) (250 mL/Hr) IV Continuous <Continuous>  thiamine 100 milliGRAM(s) Oral daily    MEDICATIONS  (PRN):  HYDROmorphone  Injectable 1 milliGRAM(s) IV Push every 3 hours PRN Moderate Pain (4 - 6)  LORazepam     Tablet 2 milliGRAM(s) Oral every 6 hours PRN alcohol withdrawal symptoms  ondansetron Injectable 4 milliGRAM(s) IV Push every 8 hours PRN Nausea and/or Vomiting      Vital Signs Last 24 Hrs  T(C): 36.7 (2018 05:54), Max: 37.1 (2018 22:01)  T(F): 98.1 (2018 05:54), Max: 98.8 (2018 22:01)  HR: 83 (2018 05:54) (67 - 83)  BP: 143/89 (2018 05:54) (143/89 - 177/93)  BP(mean): --  RR: 16 (2018 05:54) (16 - 22)  SpO2: 100% (2018 12:48) (97% - 100%)    PHYSICAL EXAM:    Constitutional: NAD, well-groomed, well-developed  HEENT: PERRLA, EOMI, Normal Hearing, MMM  Neck: No LAD, No JVD  Back: Normal spine flexure, No CVA tenderness  Respiratory: CTAB/L  Cardiovascular: S1 and S2, RRR, no M/G/R  Gastrointestinal: BS+, soft, (++) tenderness  Extremities: No peripheral edema  Vascular: 2+ peripheral pulses  Neurological: A/O x 3, no focal deficits    LABS:                        14.7   18.81 )-----------( 226      ( 2018 07:01 )             42.4         142  |  98  |  10  ----------------------------<  98  4.2   |  26  |  1.3    Ca    9.9      2018 08:12  Mg     2.0         TPro  7.4  /  Alb  4.9  /  TBili  0.7  /  DBili  0.2  /  AST  41  /  ALT  33  /  AlkPhos  64        Urinalysis Basic - ( 2018 08:12 )    Color: Yellow / Appearance: Clear / S.015 / pH: x  Gluc: x / Ketone: 80  / Bili: Small / Urobili: 0.2   Blood: x / Protein: 30 / Nitrite: Negative   Leuk Esterase: Negative / RBC: 1-2 /HPF / WBC 1-2 /HPF   Sq Epi: x / Non Sq Epi: x / Bacteria: Few      Drug Screen Urine:  Alcohol Level        RADIOLOGY & ADDITIONAL STUDIES:  reviewed in PACS

## 2018-06-29 NOTE — PROGRESS NOTE ADULT - SUBJECTIVE AND OBJECTIVE BOX
CURRENT COMPLAINTS:  The patient was admitted with pancreatitis, believed secondary to ETOH given the fact that the patient was drinking up until the time of his admission.  His recurrent drinking was precipitated by the recent passing of his brother.  On CT scan extensive peripancreatic fat stranding is noted without bile duct dilation and his LFTs were normal.  Today he has 6/10 epigastric pain, and nausea without vomiting.  His Lipase level is 986 and his WBC is 18.8K.  PAST MEDICAL & SURGICAL HISTORY:  ETOH abuse  No significant past surgical history      ALLERGIES  No Known Allergies      MEDICATIONS  folic acid 1 milliGRAM(s) Oral daily  heparin  Injectable 5000 Unit(s) SubCutaneous every 8 hours  HYDROmorphone  Injectable 1 milliGRAM(s) IV Push every 3 hours PRN  LORazepam     Tablet 2 milliGRAM(s) Oral every 4 hours  LORazepam     Tablet 1.5 milliGRAM(s) Oral every 4 hours  LORazepam     Tablet   Oral   LORazepam     Tablet 2 milliGRAM(s) Oral every 6 hours PRN  ondansetron Injectable 4 milliGRAM(s) IV Push every 8 hours PRN  sodium chloride 0.9%. 1000 milliLiter(s) IV Continuous <Continuous>  thiamine 100 milliGRAM(s) Oral daily      Physical Exam:  Vital Signs Last 24 Hrs  T(C): 36.7 (29 Jun 2018 05:54), Max: 37.1 (28 Jun 2018 22:01)  T(F): 98.1 (29 Jun 2018 05:54), Max: 98.8 (28 Jun 2018 22:01)  HR: 83 (29 Jun 2018 05:54) (75 - 83)  BP: 143/89 (29 Jun 2018 05:54) (143/89 - 177/93)  BP(mean): --  RR: 16 (29 Jun 2018 05:54) (16 - 22)  SpO2: 100% (28 Jun 2018 12:48) (98% - 100%)  HEENT:          Anicteric, neck supple, no JVD.  Chest:            No rales, rhonchi or wheezes.  Full breath sounds.  Cardiac:         RRR No S3/S4  Abdomen:     Soft but firm abdomen with moderated epigastric tenderness, normoactive bowel sounds.  Extremities:  No edema  Neurologic:   Alert and oriented x 3.    Laboratories:    CBC Full  -  ( 29 Jun 2018 07:01 )  WBC Count : 18.81 K/uL  Hemoglobin : 14.7 g/dL  Hematocrit : 42.4 %  Platelet Count - Automated : 226 K/uL  Mean Cell Volume : 90.0 fL  Mean Cell Hemoglobin : 31.2 pg  Mean Cell Hemoglobin Concentration : 34.7 g/dL  Auto Neutrophil # : x  Auto Lymphocyte # : x  Auto Monocyte # : x  Auto Eosinophil # : x  Auto Basophil # : x  Auto Neutrophil % : x  Auto Lymphocyte % : x  Auto Monocyte % : x  Auto Eosinophil % : x  Auto Basophil % : x    06-29    138  |  98  |  5<L>  ----------------------------<  94  4.2   |  25  |  0.8    Ca    8.6      29 Jun 2018 07:01  Mg     1.7     06-29    TPro  5.9<L>  /  Alb  3.9  /  TBili  0.6  /  DBili  x   /  AST  33  /  ALT  22  /  AlkPhos  46  06-29          Radiologic Imaging:

## 2018-06-29 NOTE — CONSULT NOTE ADULT - ASSESSMENT
ETOH  Opioid abuse    Place on ativan taper  hold suboxone for now  pain meds as needed  Pt not interested in any detox or program  He can attend intakes outpt program if he wants after discharge   [phone]

## 2018-06-29 NOTE — PROGRESS NOTE ADULT - SUBJECTIVE AND OBJECTIVE BOX
JACQUESDONGLAARMOND  32y  Male    Patient is a 32y old  Male who presents with a chief complaint of abdominal pain (28 Jun 2018 11:48)      INTERVAL HPI/OVERNIGHT EVENTS:      REVIEW OF SYSTEMS:  CONSTITUTIONAL: No fever, weight loss, or fatigue  EYES: No eye pain, visual disturbances, or discharge  ENMT:  No difficulty hearing, tinnitus, vertigo; No sinus or throat pain  NECK: No pain or stiffness  BREASTS: No pain, masses, or nipple discharge  RESPIRATORY: No cough, wheezing, chills or hemoptysis; No shortness of breath  CARDIOVASCULAR: No chest pain, palpitations, dizziness, or leg swelling  GASTROINTESTINAL: No abdominal or epigastric pain. No nausea, vomiting, or hematemesis; No diarrhea or constipation. No melena or hematochezia.  GENITOURINARY: No dysuria, frequency, hematuria, or incontinence  NEUROLOGICAL: No headaches, memory loss, loss of strength, numbness, or tremors  SKIN: No itching, burning, rashes, or lesions   LYMPH NODES: No enlarged glands  ENDOCRINE: No heat or cold intolerance; No hair loss  MUSCULOSKELETAL: No joint pain or swelling; No muscle, back, or extremity pain  PSYCHIATRIC: No depression, anxiety, mood swings, or difficulty sleeping  HEME/LYMPH: No easy bruising, or bleeding gums  ALLERY AND IMMUNOLOGIC: No hives or eczema    VITALS:  T(F): 98.1 (06-29-18 @ 05:54), Max: 98.8 (06-28-18 @ 22:01)  HR: 83 (06-29-18 @ 05:54) (67 - 83)  BP: 143/89 (06-29-18 @ 05:54) (143/89 - 177/93)  RR: 16 (06-29-18 @ 05:54) (16 - 22)  SpO2: 100% (06-28-18 @ 12:48) (97% - 100%)    PHYSICAL EXAM:  GENERAL: NAD, well-groomed, well-developed  HEAD:  Atraumatic, Normocephalic  EYES: EOMI, PERRLA, conjunctiva and sclera clear  ENMT: No tonsillar erythema, exudates, or enlargement; Moist mucous membranes, Good dentition, No lesions  NECK: Supple, No JVD, Normal thyroid  NERVOUS SYSTEM:  Alert & Oriented X3, Good concentration; Motor Strength 5/5 B/L upper and lower extremities; DTRs 2+ intact and symmetric  CHEST/LUNG: Clear to percussion bilaterally; No rales, rhonchi, wheezing, or rubs  HEART: Regular rate and rhythm; No murmurs, rubs, or gallops  ABDOMEN: Soft, Nontender, Nondistended; Bowel sounds present  EXTREMITIES:  2+ Peripheral Pulses, No clubbing, cyanosis, or edema  LYMPH: No lymphadenopathy noted  SKIN: No rashes or lesions    Consultant(s) Notes Reviewed:  [x ] YES  [ ] NO  Care Discussed with Consultants/Other Providers [ x] YES  [ ] NO    LABS:  MICROBIOLOGY:      RADIOLOGY & ADDITIONAL TESTS:    Imaging Personally Reviewed:  [ ] YES  [ ] NO  MEDICATION:  folic acid 1 milliGRAM(s) Oral daily  heparin  Injectable 5000 Unit(s) SubCutaneous every 8 hours  HYDROmorphone  Injectable 1 milliGRAM(s) IV Push every 3 hours PRN  LORazepam     Tablet 2 milliGRAM(s) Oral every 4 hours  LORazepam     Tablet 1.5 milliGRAM(s) Oral every 4 hours  LORazepam     Tablet   Oral   LORazepam     Tablet 2 milliGRAM(s) Oral every 6 hours PRN  ondansetron Injectable 4 milliGRAM(s) IV Push every 8 hours PRN  sodium chloride 0.9%. 1000 milliLiter(s) IV Continuous <Continuous>  thiamine 100 milliGRAM(s) Oral daily      HEALTH ISSUES:  HEALTH ISSUES - PROBLEM Dx:  ETOH abuse: ETOH abuse  Alcohol-induced acute pancreatitis, unspecified complication status: Alcohol-induced acute pancreatitis, unspecified complication status ARMOND CARRILLO  32y  Male    Patient is a 32y old  Male who presents with a chief complaint of abdominal pain (28 Jun 2018 11:48)      INTERVAL HPI/OVERNIGHT EVENTS:  No interval events. Patient still has abdominal pain radiating to the back.      REVIEW OF SYSTEMS:  CONSTITUTIONAL: No fever, weight loss, or fatigue  EYES: No eye pain, visual disturbances, or discharge  ENMT:  No difficulty hearing, tinnitus, vertigo; No sinus or throat pain  NECK: No pain or stiffness  BREASTS: No pain, masses, or nipple discharge  RESPIRATORY: No cough, wheezing, chills or hemoptysis; No shortness of breath  CARDIOVASCULAR: No chest pain, palpitations, dizziness, or leg swelling  GASTROINTESTINAL: + abdominal pain. + nausea, No vomiting, or hematemesis; No diarrhea or constipation. No melena or hematochezia.  GENITOURINARY: No dysuria, frequency, hematuria, or incontinence  NEUROLOGICAL: No headaches, memory loss, loss of strength, numbness, or tremors  SKIN: No itching, burning, rashes, or lesions   LYMPH NODES: No enlarged glands  ENDOCRINE: No heat or cold intolerance; No hair loss  MUSCULOSKELETAL: No joint pain or swelling; No muscle, back, or extremity pain  PSYCHIATRIC: No depression, anxiety, mood swings, or difficulty sleeping  HEME/LYMPH: No easy bruising, or bleeding gums  ALLERGY AND IMMUNOLOGIC: No hives or eczema      VITALS:  T(F): 98.1 (06-29-18 @ 05:54), Max: 98.8 (06-28-18 @ 22:01)  HR: 83 (06-29-18 @ 05:54) (67 - 83)  BP: 143/89 (06-29-18 @ 05:54) (143/89 - 177/93)  RR: 16 (06-29-18 @ 05:54) (16 - 22)  SpO2: 100% (06-28-18 @ 12:48) (97% - 100%)      PHYSICAL EXAM:  GENERAL: NAD, well-groomed, well-developed  HEAD:  Atraumatic, Normocephalic  EYES: EOMI, PERRLA, conjunctiva and sclera clear  ENMT: Moist mucous membranes  NECK: Supple, Normal thyroid  NERVOUS SYSTEM:  Alert & Oriented X3, Good concentration; Motor Strength 5/5 B/L upper and lower extremities  CHEST/LUNG: Clear to auscultation bilaterally; No rales, rhonchi, wheezing, or rubs  HEART: Regular rate and rhythm; No murmurs, rubs, or gallops  ABDOMEN: Soft, Nontender, Nondistended; Bowel sounds present  EXTREMITIES:  2+ Peripheral Pulses, No clubbing, cyanosis, or edema  LYMPH: No lymphadenopathy noted  SKIN: No rashes or lesions    Consultant(s) Notes Reviewed:  [x ] YES  [ ] NO  Care Discussed with Consultants/Other Providers [ x] YES  [ ] NO    LABS:                        14.7   18.81 )-----------( 226      ( 29 Jun 2018 07:01 )             42.4     06-29    138  |  98  |  5<L>  ----------------------------<  94  4.2   |  25  |  0.8    Ca    8.6      29 Jun 2018 07:01  Mg     1.7     06-29    TPro  5.9<L>  /  Alb  3.9  /  TBili  0.6  /  DBili  x   /  AST  33  /  ALT  22  /  AlkPhos  46  06-29    Amylase, Serum Total: 650  Lipase, Serum: 986 U/L (06.29.18 @ 07:01)        MICROBIOLOGY: None      RADIOLOGY & ADDITIONAL TESTS:  X-ray Chest 1 View AP/PA (06.28.18 @ 08:21)   Left upper quadrant below the left hemidiaphragm medially, there is a   focal area of loculated air. This may be related to multiple bowel loops   overlying one of another. However, free air under the diaphragm is not   excluded.      CT Abdomen and Pelvis w/ IV Cont (06.28.18 @ 09:02)   Acute pancreatitis without complications at this time.        Imaging Personally Reviewed:  [x] YES  [ ] NO    MEDICATIONS  (STANDING):  folic acid 1 milliGRAM(s) Oral daily  heparin  Injectable 5000 Unit(s) SubCutaneous every 8 hours  lactated ringers. 1000 milliLiter(s) (150 mL/Hr) IV Continuous <Continuous>  LORazepam     Tablet 1.5 milliGRAM(s) Oral every 4 hours  LORazepam     Tablet   Oral   thiamine 100 milliGRAM(s) Oral daily    MEDICATIONS  (PRN):  HYDROmorphone  Injectable 1 milliGRAM(s) IV Push every 3 hours PRN Moderate Pain (4 - 6)  ketorolac   Injectable 30 milliGRAM(s) IV Push every 6 hours PRN Severe Pain (7 - 10)  LORazepam     Tablet 2 milliGRAM(s) Oral every 6 hours PRN alcohol withdrawal symptoms  ondansetron Injectable 4 milliGRAM(s) IV Push every 8 hours PRN Nausea and/or Vomiting      HEALTH ISSUES - PROBLEM Dx:  ETOH abuse  Alcohol-induced acute pancreatitis, unspecified complication status

## 2018-06-29 NOTE — PROGRESS NOTE ADULT - ASSESSMENT
The patient was admitted with pancreatitis, believed secondary to ETOH given the fact that the patient was drinking up until the time of his admission.  His recurrent drinking was precipitated by the recent passing of his brother.  On CT scan extensive peripancreatic fat stranding is noted without bile duct dilation and his LFTs were normal.  Today he has 6/10 epigastric pain, and nausea without vomiting.  His Lipase level is 986 and his WBC is 18.8K.    I would recommend IV fluid hydration with Ringer's lactate at 150 cc/hr, and although patient can have sips of water and chips of ice I would not start a clear liquid diet as yet until his pain and tenderness decrease more substantially.  I would give antiemetics and pain medication prn and the patient has been advised to avoid all alcohol use in the future. Please follow BMP, Lipase and WBC regularly.  Attempts to contact Dr Rosenberg were unsuccessful I will try later.

## 2018-06-30 LAB
ALBUMIN SERPL ELPH-MCNC: 3.5 G/DL — SIGNIFICANT CHANGE UP (ref 3.5–5.2)
ALP SERPL-CCNC: 40 U/L — SIGNIFICANT CHANGE UP (ref 30–115)
ALT FLD-CCNC: 18 U/L — SIGNIFICANT CHANGE UP (ref 0–41)
ANION GAP SERPL CALC-SCNC: 12 MMOL/L — SIGNIFICANT CHANGE UP (ref 7–14)
APPEARANCE UR: CLEAR — SIGNIFICANT CHANGE UP
AST SERPL-CCNC: 28 U/L — SIGNIFICANT CHANGE UP (ref 0–41)
BACTERIA # UR AUTO: (no result)
BILIRUB SERPL-MCNC: 0.6 MG/DL — SIGNIFICANT CHANGE UP (ref 0.2–1.2)
BILIRUB UR-MCNC: (no result)
BUN SERPL-MCNC: 6 MG/DL — LOW (ref 10–20)
CALCIUM SERPL-MCNC: 8.5 MG/DL — SIGNIFICANT CHANGE UP (ref 8.5–10.1)
CHLORIDE SERPL-SCNC: 99 MMOL/L — SIGNIFICANT CHANGE UP (ref 98–110)
CHOLEST SERPL-MCNC: 84 MG/DL — LOW (ref 100–200)
CO2 SERPL-SCNC: 28 MMOL/L — SIGNIFICANT CHANGE UP (ref 17–32)
COLOR SPEC: YELLOW — SIGNIFICANT CHANGE UP
CREAT SERPL-MCNC: 0.8 MG/DL — SIGNIFICANT CHANGE UP (ref 0.7–1.5)
DIFF PNL FLD: NEGATIVE — SIGNIFICANT CHANGE UP
EPI CELLS # UR: (no result) /HPF
GLUCOSE SERPL-MCNC: 85 MG/DL — SIGNIFICANT CHANGE UP (ref 70–99)
GLUCOSE UR QL: NEGATIVE MG/DL — SIGNIFICANT CHANGE UP
HCT VFR BLD CALC: 40.6 % — LOW (ref 42–52)
HDLC SERPL-MCNC: 31 MG/DL — LOW (ref 40–125)
HGB BLD-MCNC: 13.9 G/DL — LOW (ref 14–18)
KETONES UR-MCNC: NEGATIVE — SIGNIFICANT CHANGE UP
LEUKOCYTE ESTERASE UR-ACNC: NEGATIVE — SIGNIFICANT CHANGE UP
LIPID PNL WITH DIRECT LDL SERPL: 43 MG/DL — SIGNIFICANT CHANGE UP (ref 4–129)
MAGNESIUM SERPL-MCNC: 2.1 MG/DL — SIGNIFICANT CHANGE UP (ref 1.8–2.4)
MCHC RBC-ENTMCNC: 30.9 PG — SIGNIFICANT CHANGE UP (ref 27–31)
MCHC RBC-ENTMCNC: 34.2 G/DL — SIGNIFICANT CHANGE UP (ref 32–37)
MCV RBC AUTO: 90.2 FL — SIGNIFICANT CHANGE UP (ref 80–94)
NITRITE UR-MCNC: NEGATIVE — SIGNIFICANT CHANGE UP
NRBC # BLD: 0 /100 WBCS — SIGNIFICANT CHANGE UP (ref 0–0)
PH UR: 6.5 — SIGNIFICANT CHANGE UP (ref 5–8)
PHOSPHATE SERPL-MCNC: 2.4 MG/DL — SIGNIFICANT CHANGE UP (ref 2.1–4.9)
PLATELET # BLD AUTO: 205 K/UL — SIGNIFICANT CHANGE UP (ref 130–400)
POTASSIUM SERPL-MCNC: 3.8 MMOL/L — SIGNIFICANT CHANGE UP (ref 3.5–5)
POTASSIUM SERPL-SCNC: 3.8 MMOL/L — SIGNIFICANT CHANGE UP (ref 3.5–5)
PROT SERPL-MCNC: 5.5 G/DL — LOW (ref 6–8)
PROT UR-MCNC: 100 MG/DL
RBC # BLD: 4.5 M/UL — LOW (ref 4.7–6.1)
RBC # FLD: 13.5 % — SIGNIFICANT CHANGE UP (ref 11.5–14.5)
RBC CASTS # UR COMP ASSIST: SIGNIFICANT CHANGE UP /HPF
SODIUM SERPL-SCNC: 139 MMOL/L — SIGNIFICANT CHANGE UP (ref 135–146)
SP GR SPEC: 1.02 — SIGNIFICANT CHANGE UP (ref 1.01–1.03)
TOTAL CHOLESTEROL/HDL RATIO MEASUREMENT: 2.7 RATIO — LOW (ref 4–5.5)
TRIGL SERPL-MCNC: 37 MG/DL — SIGNIFICANT CHANGE UP (ref 10–149)
UROBILINOGEN FLD QL: 2 MG/DL (ref 0.2–0.2)
WBC # BLD: 14.78 K/UL — HIGH (ref 4.8–10.8)
WBC # FLD AUTO: 14.78 K/UL — HIGH (ref 4.8–10.8)
WBC UR QL: SIGNIFICANT CHANGE UP /HPF

## 2018-06-30 RX ORDER — MEROPENEM 1 G/30ML
1000 INJECTION INTRAVENOUS EVERY 8 HOURS
Qty: 0 | Refills: 0 | Status: DISCONTINUED | OUTPATIENT
Start: 2018-06-30 | End: 2018-07-01

## 2018-06-30 RX ORDER — SODIUM CHLORIDE 9 MG/ML
1000 INJECTION, SOLUTION INTRAVENOUS
Qty: 0 | Refills: 0 | Status: DISCONTINUED | OUTPATIENT
Start: 2018-06-30 | End: 2018-07-01

## 2018-06-30 RX ORDER — MEROPENEM 1 G/30ML
INJECTION INTRAVENOUS
Qty: 0 | Refills: 0 | Status: DISCONTINUED | OUTPATIENT
Start: 2018-06-30 | End: 2018-07-01

## 2018-06-30 RX ORDER — NICOTINE POLACRILEX 2 MG
1 GUM BUCCAL DAILY
Qty: 0 | Refills: 0 | Status: DISCONTINUED | OUTPATIENT
Start: 2018-06-30 | End: 2018-07-01

## 2018-06-30 RX ORDER — MEROPENEM 1 G/30ML
1000 INJECTION INTRAVENOUS ONCE
Qty: 0 | Refills: 0 | Status: COMPLETED | OUTPATIENT
Start: 2018-06-30 | End: 2018-06-30

## 2018-06-30 RX ORDER — ACETAMINOPHEN 500 MG
650 TABLET ORAL EVERY 4 HOURS
Qty: 0 | Refills: 0 | Status: DISCONTINUED | OUTPATIENT
Start: 2018-06-30 | End: 2018-07-03

## 2018-06-30 RX ADMIN — MEROPENEM 100 MILLIGRAM(S): 1 INJECTION INTRAVENOUS at 18:39

## 2018-06-30 RX ADMIN — SODIUM CHLORIDE 150 MILLILITER(S): 9 INJECTION, SOLUTION INTRAVENOUS at 12:03

## 2018-06-30 RX ADMIN — Medication 1.5 MILLIGRAM(S): at 09:53

## 2018-06-30 RX ADMIN — HYDROMORPHONE HYDROCHLORIDE 1 MILLIGRAM(S): 2 INJECTION INTRAMUSCULAR; INTRAVENOUS; SUBCUTANEOUS at 10:15

## 2018-06-30 RX ADMIN — Medication 1.5 MILLIGRAM(S): at 14:08

## 2018-06-30 RX ADMIN — SODIUM CHLORIDE 250 MILLILITER(S): 9 INJECTION, SOLUTION INTRAVENOUS at 17:41

## 2018-06-30 RX ADMIN — HYDROMORPHONE HYDROCHLORIDE 1 MILLIGRAM(S): 2 INJECTION INTRAMUSCULAR; INTRAVENOUS; SUBCUTANEOUS at 04:00

## 2018-06-30 RX ADMIN — Medication 2 MILLIGRAM(S): at 18:38

## 2018-06-30 RX ADMIN — Medication 1 PATCH: at 18:37

## 2018-06-30 RX ADMIN — Medication 30 MILLIGRAM(S): at 03:10

## 2018-06-30 RX ADMIN — HYDROMORPHONE HYDROCHLORIDE 1 MILLIGRAM(S): 2 INJECTION INTRAMUSCULAR; INTRAVENOUS; SUBCUTANEOUS at 06:14

## 2018-06-30 RX ADMIN — Medication 1.5 MILLIGRAM(S): at 02:26

## 2018-06-30 RX ADMIN — Medication 30 MILLIGRAM(S): at 04:19

## 2018-06-30 RX ADMIN — HYDROMORPHONE HYDROCHLORIDE 1 MILLIGRAM(S): 2 INJECTION INTRAMUSCULAR; INTRAVENOUS; SUBCUTANEOUS at 14:09

## 2018-06-30 RX ADMIN — Medication 30 MILLIGRAM(S): at 11:35

## 2018-06-30 RX ADMIN — HYDROMORPHONE HYDROCHLORIDE 1 MILLIGRAM(S): 2 INJECTION INTRAMUSCULAR; INTRAVENOUS; SUBCUTANEOUS at 09:53

## 2018-06-30 RX ADMIN — MEROPENEM 100 MILLIGRAM(S): 1 INJECTION INTRAVENOUS at 21:59

## 2018-06-30 RX ADMIN — Medication 1 MILLIGRAM(S): at 17:32

## 2018-06-30 RX ADMIN — HYDROMORPHONE HYDROCHLORIDE 1 MILLIGRAM(S): 2 INJECTION INTRAMUSCULAR; INTRAVENOUS; SUBCUTANEOUS at 03:14

## 2018-06-30 RX ADMIN — Medication 650 MILLIGRAM(S): at 20:41

## 2018-06-30 RX ADMIN — HYDROMORPHONE HYDROCHLORIDE 1 MILLIGRAM(S): 2 INJECTION INTRAMUSCULAR; INTRAVENOUS; SUBCUTANEOUS at 21:59

## 2018-06-30 RX ADMIN — HYDROMORPHONE HYDROCHLORIDE 1 MILLIGRAM(S): 2 INJECTION INTRAMUSCULAR; INTRAVENOUS; SUBCUTANEOUS at 18:14

## 2018-06-30 RX ADMIN — Medication 1.5 MILLIGRAM(S): at 06:14

## 2018-06-30 RX ADMIN — Medication 1 MILLIGRAM(S): at 21:59

## 2018-06-30 RX ADMIN — HYDROMORPHONE HYDROCHLORIDE 1 MILLIGRAM(S): 2 INJECTION INTRAMUSCULAR; INTRAVENOUS; SUBCUTANEOUS at 07:18

## 2018-06-30 RX ADMIN — HYDROMORPHONE HYDROCHLORIDE 1 MILLIGRAM(S): 2 INJECTION INTRAMUSCULAR; INTRAVENOUS; SUBCUTANEOUS at 14:40

## 2018-06-30 RX ADMIN — Medication 1 MILLIGRAM(S): at 14:14

## 2018-06-30 RX ADMIN — Medication 100 MILLIGRAM(S): at 14:12

## 2018-06-30 RX ADMIN — HYDROMORPHONE HYDROCHLORIDE 1 MILLIGRAM(S): 2 INJECTION INTRAMUSCULAR; INTRAVENOUS; SUBCUTANEOUS at 17:29

## 2018-06-30 RX ADMIN — HYDROMORPHONE HYDROCHLORIDE 1 MILLIGRAM(S): 2 INJECTION INTRAMUSCULAR; INTRAVENOUS; SUBCUTANEOUS at 22:15

## 2018-06-30 RX ADMIN — SODIUM CHLORIDE 250 MILLILITER(S): 9 INJECTION, SOLUTION INTRAVENOUS at 21:58

## 2018-06-30 RX ADMIN — SODIUM CHLORIDE 150 MILLILITER(S): 9 INJECTION, SOLUTION INTRAVENOUS at 06:14

## 2018-06-30 RX ADMIN — Medication 650 MILLIGRAM(S): at 04:20

## 2018-06-30 NOTE — PROGRESS NOTE ADULT - ASSESSMENT
31 yo male with pmh that includes continuous alcohol dependence, continuous opiate dependence, last admitted in 2014 for alcoholic pancreatitis, who presented with abdominal pain and found with alcoholic pancreatitis, without gallstones    course is that of improvement in pain  though on chart review noted with temp 100.4 ovenight      # Acute alcoholic pancreatitis, with small temp overnight that resolved with tylenol, wbc is trending down and overall feels better. patient advised to avoid all alcohol use in the future  - obtain w/u: CXR, UA, Ucx, BCX  - if recurrent fever or worsening leuocytosis or worsening pain --> start meropenem and consider rescan. no abx for now per GI  - vitals q4  - cont NPO for today, consider clears tomorrow depending on progress  - IVF  ml /hr  - cont pain control with dilaudid 1mg iv q3 hrs prn  - cont to hold home suboxone for now while needs iv opiates      # Continuous alcohol dependence: was with acute withdrawal on presentation, now improved. seen by addiction medicine who noted he is not interested in any detox or program  - cont ativan taper  - He can attend intakes outpt program if he wants after discharge   [phone]      # Mild prerenal VALERIE on admission: now resolved      # Coordination of care:  - VTE ppx with HSQ  - plan of care reviewed with patient, family, RN, and GI 33 yo male with pmh that includes continuous alcohol dependence, continuous opiate dependence, last admitted in 2014 for alcoholic pancreatitis, who presented with abdominal pain and found with alcoholic pancreatitis, without gallstones    course is that of improvement in pain  though on chart review noted with temp 100.4 ovenight      # Acute alcoholic pancreatitis, with small temp overnight that resolved with tylenol, wbc is trending down and overall feels better. patient advised to avoid all alcohol use in the future. supect tep is inflamation form pancreatitis related  - obtain w/u: CXR, UA, Ucx, BCX  - if recurrent fever or worsening leuocytosis or worsening pain --> start meropenem and repeat CT scan r/o necrosis. no abx for now per GI for the 1x low grade temp  - vitals q4  - cont NPO for today, consider clears tomorrow depending on progress  - IVF  ml /hr  - cont pain control with dilaudid 1mg iv q3 hrs prn  - cont to hold home suboxone for now while needs iv opiates      # Continuous alcohol dependence: was with acute withdrawal on presentation, now improved. seen by addiction medicine who noted he is not interested in any detox or program  - cont ativan taper  - He can attend intakes outpt program if he wants after discharge   [phone]      # Mild prerenal VALERIE on admission: now resolved      # Coordination of care:  - VTE ppx with HSQ  - plan of care reviewed with patient, family, RN, and GI

## 2018-06-30 NOTE — DIETITIAN INITIAL EVALUATION ADULT. - OTHER INFO
pt c/o sharp abd pain with + N/V admitted with acute pancreatitis 2/2 ETOH abuse, presently NPO possible clears in AM, MD and RN requested RD to provide diet education to pt for post d/c, written materials provided. PMHX: ETOH abuse, pancreatitis pt c/o sharp abd pain with + N/V admitted with acute pancreatitis 2/2 ETOH abuse, presently NPO possible clears in AM, MD and RN requested RD to provide diet education (heart healthy)  to pt for post d/c, written materials provided. PMHX: ETOH abuse, pancreatitis

## 2018-06-30 NOTE — PROGRESS NOTE ADULT - ASSESSMENT
etoh pancreatitis  plan continiue agressive fluid resussitation with ringers lactate   remain npo for now  if pain better tomorrow consider statring clear liquid diet

## 2018-06-30 NOTE — PROGRESS NOTE ADULT - SUBJECTIVE AND OBJECTIVE BOX
CC: f/u pancreatitis      INTERVAL EVENTS INCLUDE:  seen by detox MD who confirmed planned course of action  care reviewed with DR Rocha of GI, advised can consider clears tomorrow but to remain npo today  was found 'vaping' by RN around 1130 and was counsled by RN as well as mother as well as myself. offered and declined nicotine patch      ROS:      VITALS:  Vital Signs Last 24 Hrs  T(C): 37.2 (30 Jun 2018 05:23), Max: 38 (30 Jun 2018 03:46)  T(F): 99 (30 Jun 2018 05:23), Max: 100.4 (30 Jun 2018 03:46)  HR: 111 (30 Jun 2018 05:23) (97 - 121)  BP: 114/64 (30 Jun 2018 05:23) (114/64 - 141/80)  BP(mean): --  RR: 16 (30 Jun 2018 05:23) (16 - 18)  SpO2: --      PHYSICAL EXAM:  GENERAL: NAD, well-groomed, well-developed  HEAD = atraumatic, normoocehalic  EYES: conjunctiva and sclera clear  NECK: Supple, No JVD  NERVOUS SYSTEM:  Alert & Oriented X3, Good concentration  CHEST/LUNG: Clear to ausculatation bilaterally; No rales, rhonchi, wheezing, or rubs  HEART: Regular rate and rhythm; No murmurs, rubs, or gallops  ABDOMEN: Soft, Nontender, Nondistended; Bowel sounds present  EXTREMITIES:  BL No clubbing, cyanosis, or edema        DATA:                          13.9   14.78 )-----------( 205      ( 30 Jun 2018 06:20 )             40.6     06-30    139  |  99  |  6<L>  ----------------------------<  85  3.8   |  28  |  0.8    Ca    8.5      30 Jun 2018 06:20  Phos  2.4     06-30  Mg     2.1     06-30    TPro  5.5<L>  /  Alb  3.5  /  TBili  0.6  /  DBili  x   /  AST  28  /  ALT  18  /  AlkPhos  40  06-30      triglycerides = 37 CC: f/u pancreatitis      INTERVAL EVENTS INCLUDE:  seen by detox MD who confirmed planned course of action  care reviewed with Dr Rocha of GI, advised can consider clears tomorrow but to remain npo today  was found 'vaping' by RN around 1130 and was counsled by RN as well as mother as well as myself. offered and declined nicotine patch      ROS:  says he is overall better, less tender  no fevers  pain is better  no more shakes      VITALS:  Vital Signs Last 24 Hrs  T(C): 37.2 (30 Jun 2018 05:23), Max: 38 (30 Jun 2018 03:46)  T(F): 99 (30 Jun 2018 05:23), Max: 100.4 (30 Jun 2018 03:46)  HR: 111 (30 Jun 2018 05:23) (97 - 121)  BP: 114/64 (30 Jun 2018 05:23) (114/64 - 141/80)  BP(mean): --  RR: 16 (30 Jun 2018 05:23) (16 - 18)  SpO2: --      PHYSICAL EXAM:  GENERAL: NAD, appear comfortable no more resltenssness and no diaphoresis  NERVOUS SYSTEM:  Alert & Oriented X3, Good concentration  CHEST/LUNG: Clear to ausculatation bilaterally; No rales, rhonchi, wheezing, or rubs  HEART: Regular rate and rhythm; No murmurs, rubs, or gallops  ABDOMEN: Soft, tender epigastrically but less than prior. no gaurdng or ebound  EXTREMITIES:  BL No clubbing, cyanosis, or edema        DATA:                          13.9   14.78 )-----------( 205      ( 30 Jun 2018 06:20 )             40.6     06-30    139  |  99  |  6<L>  ----------------------------<  85  3.8   |  28  |  0.8    Ca    8.5      30 Jun 2018 06:20  Phos  2.4     06-30  Mg     2.1     06-30    TPro  5.5<L>  /  Alb  3.5  /  TBili  0.6  /  DBili  x   /  AST  28  /  ALT  18  /  AlkPhos  40  06-30      triglycerides = 37

## 2018-06-30 NOTE — PROGRESS NOTE ADULT - SUBJECTIVE AND OBJECTIVE BOX
Chief Complaint: Patient is a 32y old  Male who presents with a chief complaint of abdominal pain (28 Jun 2018 11:48)  f/u pancreatiotis -covering for service clinically somewhat improving with less abdominal pain and no further emesis          Review Of Systems:  no fever /chills / diarrhea  Medications:  acetaminophen   Tablet 650 milliGRAM(s) Oral every 4 hours PRN  folic acid 1 milliGRAM(s) Oral daily  heparin  Injectable 5000 Unit(s) SubCutaneous every 8 hours  HYDROmorphone  Injectable 1 milliGRAM(s) IV Push every 3 hours PRN  ketorolac   Injectable 30 milliGRAM(s) IV Push every 6 hours PRN  lactated ringers. 1000 milliLiter(s) IV Continuous <Continuous>  LORazepam     Tablet 1.5 milliGRAM(s) Oral every 4 hours  LORazepam     Tablet 1 milliGRAM(s) Oral every 4 hours  LORazepam     Tablet   Oral   LORazepam     Tablet 2 milliGRAM(s) Oral every 6 hours PRN  ondansetron Injectable 4 milliGRAM(s) IV Push every 8 hours PRN  thiamine 100 milliGRAM(s) Oral daily      PMHX/PSHX:  ETOH abuse  No significant past surgical history      Family history:  No pertinent family history in first degree relatives      Social History:       Allergies:  No Known Allergies            PHYSICAL EXAM:   Vital Signs:  Vital Signs Last 24 Hrs  T(C): 37.2 (30 Jun 2018 05:23), Max: 38 (30 Jun 2018 03:46)  T(F): 99 (30 Jun 2018 05:23), Max: 100.4 (30 Jun 2018 03:46)  HR: 111 (30 Jun 2018 05:23) (97 - 121)  BP: 114/64 (30 Jun 2018 05:23) (114/64 - 141/80)  BP(mean): --  RR: 16 (30 Jun 2018 05:23) (16 - 18)  SpO2: --  Daily     Daily     T(C): 37.2 (06-30-18 @ 05:23), Max: 38 (06-30-18 @ 03:46)  HR: 111 (06-30-18 @ 05:23) (97 - 121)  BP: 114/64 (06-30-18 @ 05:23) (114/64 - 141/80)  RR: 16 (06-30-18 @ 05:23) (16 - 18)  SpO2: --    GENERAL:  Appears stated age, well-groomed, well-nourished, no distress  ABDOMEN:  Soft, non-tender, non-distended, normoactive bowel sounds,  no masses ,no hepato-splenomegaly, no signs of chronic liver disease        LABS:                        13.9   14.78 )-----------( 205      ( 30 Jun 2018 06:20 )             40.6     06-30    139  |  99  |  6<L>  ----------------------------<  85  3.8   |  28  |  0.8    Ca    8.5      30 Jun 2018 06:20  Phos  2.4     06-30  Mg     2.1     06-30    TPro  5.5<L>  /  Alb  3.5  /  TBili  0.6  /  DBili  x   /  AST  28  /  ALT  18  /  AlkPhos  40  06-30    LIVER FUNCTIONS - ( 30 Jun 2018 06:20 )  Alb: 3.5 g/dL / Pro: 5.5 g/dL / ALK PHOS: 40 U/L / ALT: 18 U/L / AST: 28 U/L / GGT: x                   Imaging:  to the pubic symphysis following administration of 100cc Optiray 320   intravenous contrast.  Oral contrast was not administered.  Reformatted   images in the coronal and sagittal planes were acquired.    COMPARISON CT: 12/25/2014.    OTHER STUDIES USED FOR CORRELATION: None.       FINDINGS:    LOWER CHEST: Unremarkable.    HEPATOBILIARY: Unremarkable.    SPLEEN: Unremarkable.    PANCREAS: Extensive peripancreatic fat stranding and fluid compatible   with pancreatitis. No peripancreatic fluid collections. Normal pancreatic   enhancement. Patent splenic vein.    ADRENAL GLANDS: Unremarkable.    KIDNEYS: Subcentimeter bilateral renal hypodensities probably   representing cysts. No stones or hydronephrosis.    ABDOMINOPELVIC NODES: Unremarkable.    PELVIC ORGANS: Unremarkable.    PERITONEUM/MESENTERY/BOWEL: Unremarkable. Normal appendix.    BONES/SOFT TISSUES: Unremarkable.        IMPRESSION:     Acute pancreatitis without complications at this time.

## 2018-07-01 LAB
ALBUMIN SERPL ELPH-MCNC: 3.5 G/DL — SIGNIFICANT CHANGE UP (ref 3.5–5.2)
ALP SERPL-CCNC: 43 U/L — SIGNIFICANT CHANGE UP (ref 30–115)
ALT FLD-CCNC: 21 U/L — SIGNIFICANT CHANGE UP (ref 0–41)
ANION GAP SERPL CALC-SCNC: 14 MMOL/L — SIGNIFICANT CHANGE UP (ref 7–14)
AST SERPL-CCNC: 34 U/L — SIGNIFICANT CHANGE UP (ref 0–41)
BILIRUB SERPL-MCNC: 0.6 MG/DL — SIGNIFICANT CHANGE UP (ref 0.2–1.2)
BUN SERPL-MCNC: 4 MG/DL — LOW (ref 10–20)
CALCIUM SERPL-MCNC: 8.7 MG/DL — SIGNIFICANT CHANGE UP (ref 8.5–10.1)
CHLORIDE SERPL-SCNC: 101 MMOL/L — SIGNIFICANT CHANGE UP (ref 98–110)
CO2 SERPL-SCNC: 28 MMOL/L — SIGNIFICANT CHANGE UP (ref 17–32)
CREAT SERPL-MCNC: 0.7 MG/DL — SIGNIFICANT CHANGE UP (ref 0.7–1.5)
GLUCOSE SERPL-MCNC: 111 MG/DL — HIGH (ref 70–99)
HCT VFR BLD CALC: 39.3 % — LOW (ref 42–52)
HGB BLD-MCNC: 13.3 G/DL — LOW (ref 14–18)
MAGNESIUM SERPL-MCNC: 2.2 MG/DL — SIGNIFICANT CHANGE UP (ref 1.8–2.4)
MCHC RBC-ENTMCNC: 31.1 PG — HIGH (ref 27–31)
MCHC RBC-ENTMCNC: 33.8 G/DL — SIGNIFICANT CHANGE UP (ref 32–37)
MCV RBC AUTO: 92 FL — SIGNIFICANT CHANGE UP (ref 80–94)
NRBC # BLD: 0 /100 WBCS — SIGNIFICANT CHANGE UP (ref 0–0)
PLATELET # BLD AUTO: 226 K/UL — SIGNIFICANT CHANGE UP (ref 130–400)
POTASSIUM SERPL-MCNC: 3.6 MMOL/L — SIGNIFICANT CHANGE UP (ref 3.5–5)
POTASSIUM SERPL-SCNC: 3.6 MMOL/L — SIGNIFICANT CHANGE UP (ref 3.5–5)
PROT SERPL-MCNC: 5.7 G/DL — LOW (ref 6–8)
RBC # BLD: 4.27 M/UL — LOW (ref 4.7–6.1)
RBC # FLD: 13.7 % — SIGNIFICANT CHANGE UP (ref 11.5–14.5)
SODIUM SERPL-SCNC: 143 MMOL/L — SIGNIFICANT CHANGE UP (ref 135–146)
WBC # BLD: 12.69 K/UL — HIGH (ref 4.8–10.8)
WBC # FLD AUTO: 12.69 K/UL — HIGH (ref 4.8–10.8)

## 2018-07-01 RX ORDER — SODIUM CHLORIDE 9 MG/ML
1000 INJECTION, SOLUTION INTRAVENOUS
Qty: 0 | Refills: 0 | Status: DISCONTINUED | OUTPATIENT
Start: 2018-07-01 | End: 2018-07-02

## 2018-07-01 RX ORDER — AMPICILLIN SODIUM AND SULBACTAM SODIUM 250; 125 MG/ML; MG/ML
3 INJECTION, POWDER, FOR SUSPENSION INTRAMUSCULAR; INTRAVENOUS EVERY 6 HOURS
Qty: 0 | Refills: 0 | Status: DISCONTINUED | OUTPATIENT
Start: 2018-07-01 | End: 2018-07-03

## 2018-07-01 RX ADMIN — Medication 1 MILLIGRAM(S): at 14:07

## 2018-07-01 RX ADMIN — MEROPENEM 100 MILLIGRAM(S): 1 INJECTION INTRAVENOUS at 06:09

## 2018-07-01 RX ADMIN — SODIUM CHLORIDE 250 MILLILITER(S): 9 INJECTION, SOLUTION INTRAVENOUS at 14:16

## 2018-07-01 RX ADMIN — HYDROMORPHONE HYDROCHLORIDE 1 MILLIGRAM(S): 2 INJECTION INTRAMUSCULAR; INTRAVENOUS; SUBCUTANEOUS at 06:09

## 2018-07-01 RX ADMIN — HYDROMORPHONE HYDROCHLORIDE 1 MILLIGRAM(S): 2 INJECTION INTRAMUSCULAR; INTRAVENOUS; SUBCUTANEOUS at 17:54

## 2018-07-01 RX ADMIN — Medication 2 MILLIGRAM(S): at 11:36

## 2018-07-01 RX ADMIN — HYDROMORPHONE HYDROCHLORIDE 1 MILLIGRAM(S): 2 INJECTION INTRAMUSCULAR; INTRAVENOUS; SUBCUTANEOUS at 09:32

## 2018-07-01 RX ADMIN — Medication 1 MILLIGRAM(S): at 06:10

## 2018-07-01 RX ADMIN — HYDROMORPHONE HYDROCHLORIDE 1 MILLIGRAM(S): 2 INJECTION INTRAMUSCULAR; INTRAVENOUS; SUBCUTANEOUS at 21:45

## 2018-07-01 RX ADMIN — HYDROMORPHONE HYDROCHLORIDE 1 MILLIGRAM(S): 2 INJECTION INTRAMUSCULAR; INTRAVENOUS; SUBCUTANEOUS at 21:29

## 2018-07-01 RX ADMIN — SODIUM CHLORIDE 125 MILLILITER(S): 9 INJECTION, SOLUTION INTRAVENOUS at 21:29

## 2018-07-01 RX ADMIN — SODIUM CHLORIDE 250 MILLILITER(S): 9 INJECTION, SOLUTION INTRAVENOUS at 09:29

## 2018-07-01 RX ADMIN — HYDROMORPHONE HYDROCHLORIDE 1 MILLIGRAM(S): 2 INJECTION INTRAMUSCULAR; INTRAVENOUS; SUBCUTANEOUS at 02:26

## 2018-07-01 RX ADMIN — MEROPENEM 100 MILLIGRAM(S): 1 INJECTION INTRAVENOUS at 14:08

## 2018-07-01 RX ADMIN — Medication 0.5 MILLIGRAM(S): at 18:20

## 2018-07-01 RX ADMIN — HYDROMORPHONE HYDROCHLORIDE 1 MILLIGRAM(S): 2 INJECTION INTRAMUSCULAR; INTRAVENOUS; SUBCUTANEOUS at 18:20

## 2018-07-01 RX ADMIN — Medication 100 MILLIGRAM(S): at 11:36

## 2018-07-01 RX ADMIN — HYDROMORPHONE HYDROCHLORIDE 1 MILLIGRAM(S): 2 INJECTION INTRAMUSCULAR; INTRAVENOUS; SUBCUTANEOUS at 14:07

## 2018-07-01 RX ADMIN — Medication 0.5 MILLIGRAM(S): at 21:29

## 2018-07-01 RX ADMIN — Medication 1 MILLIGRAM(S): at 11:35

## 2018-07-01 RX ADMIN — Medication 1 MILLIGRAM(S): at 09:32

## 2018-07-01 RX ADMIN — Medication 1 PATCH: at 18:20

## 2018-07-01 RX ADMIN — Medication 2 MILLIGRAM(S): at 02:12

## 2018-07-01 RX ADMIN — HYDROMORPHONE HYDROCHLORIDE 1 MILLIGRAM(S): 2 INJECTION INTRAMUSCULAR; INTRAVENOUS; SUBCUTANEOUS at 10:00

## 2018-07-01 RX ADMIN — HYDROMORPHONE HYDROCHLORIDE 1 MILLIGRAM(S): 2 INJECTION INTRAMUSCULAR; INTRAVENOUS; SUBCUTANEOUS at 02:06

## 2018-07-01 NOTE — PROGRESS NOTE ADULT - SUBJECTIVE AND OBJECTIVE BOX
CC: f/u pancreatitis      INTERVAL EVENTS INCLUDE:  improved, walking around, ok'ed by GI for full liquids today      ROS:        Vital Signs Last 24 Hrs  T(C): 36.9 (01 Jul 2018 18:05), Max: 38 (30 Jun 2018 22:21)  T(F): 98.5 (01 Jul 2018 18:05), Max: 100.4 (30 Jun 2018 22:21)  HR: 99 (01 Jul 2018 18:05) (99 - 111)  BP: 121/61 (01 Jul 2018 18:05) (105/57 - 142/85)  BP(mean): --  RR: 16 (01 Jul 2018 18:05) (16 - 18)  SpO2: --      PHYSICAL EXAM:  GENERAL: NAD, appear comfortable no more resltenssness and no diaphoresis  NERVOUS SYSTEM:  Alert & Oriented X3, Good concentration  CHEST/LUNG: Clear to ausculatation bilaterally; No rales, rhonchi, wheezing, or rubs  HEART: Regular rate and rhythm; No murmurs, rubs, or gallops  ABDOMEN: Soft, tender epigastrically but less than prior. no gaurdng or ebound  EXTREMITIES:  BL No clubbing, cyanosis, or edema        DATA:      Culture - Urine (collected 30 Jun 2018 15:43)              // asscoated UA noninfectious  Source: .Urine Clean Catch (Midstream)  Preliminary Report (01 Jul 2018 18:50):    10,000 - 49,000 CFU/mL Enterococcus faecalis               CXR: Left lower lobe opacity/pleural effusion. No air leak.                              13.3   12.69 )-----------( 226      ( 01 Jul 2018 07:40 )             39.3     07-01    143  |  101  |  4<L>  ----------------------------<  111<H>  3.6   |  28  |  0.7    Ca    8.7      01 Jul 2018 07:40  Phos  2.4     06-30  Mg     2.2     07-01    TPro  5.7<L>  /  Alb  3.5  /  TBili  0.6  /  DBili  x   /  AST  34  /  ALT  21  /  AlkPhos  43  07-01 CC: f/u pancreatitis      INTERVAL EVENTS INCLUDE:  improved, walking around, ok'ed by GI for full liquids today      ROS:  feeling better  tolerating liquids  playing a video game today  wanted to show me something on his back, has a freely movable lipoma      Vital Signs Last 24 Hrs  T(C): 36.9 (01 Jul 2018 18:05), Max: 38 (30 Jun 2018 22:21)  T(F): 98.5 (01 Jul 2018 18:05), Max: 100.4 (30 Jun 2018 22:21)  HR: 99 (01 Jul 2018 18:05) (99 - 111)  BP: 121/61 (01 Jul 2018 18:05) (105/57 - 142/85)  BP(mean): --  RR: 16 (01 Jul 2018 18:05) (16 - 18)  SpO2: --      PHYSICAL EXAM:  GENERAL: NAD, appear comfortable now. alert an joking around  NERVOUS SYSTEM:  Alert & Oriented X3, Good concentration  CHEST/LUNG: Clear to ausculatation bilaterally; No rales, rhonchi, wheezing, or rubs  HEART: Regular rate and rhythm; No murmurs, rubs, or gallops  ABDOMEN: Soft, no longer tender. there is still some firmness to the exam. nd + BS. no gaurding or rebound  EXTREMITIES:  getting edema of the LE now        DATA:      Culture - Urine (collected 30 Jun 2018 15:43)              // asscoated UA noninfectious  Source: .Urine Clean Catch (Midstream)  Preliminary Report (01 Jul 2018 18:50):    10,000 - 49,000 CFU/mL Enterococcus faecalis               CXR: Left lower lobe opacity/pleural effusion. No air leak.                              13.3   12.69 )-----------( 226      ( 01 Jul 2018 07:40 )             39.3     07-01    143  |  101  |  4<L>  ----------------------------<  111<H>  3.6   |  28  |  0.7    Ca    8.7      01 Jul 2018 07:40  Phos  2.4     06-30  Mg     2.2     07-01    TPro  5.7<L>  /  Alb  3.5  /  TBili  0.6  /  DBili  x   /  AST  34  /  ALT  21  /  AlkPhos  43  07-01

## 2018-07-01 NOTE — PROGRESS NOTE ADULT - ASSESSMENT
31 yo male with pmh that includes continuous alcohol dependence, continuous opiate dependence, last admitted in 2014 for alcoholic pancreatitis, who presented with abdominal pain and found with alcoholic pancreatitis, without gallstones. course is that of improvement in pain. low grade temp resolved      # Acute alcoholic pancreatitis, with one low grade temp that has since resolved. with improving clinical clourse  - full liquid diet today and advance as tolerated  - IVF, decrease rate to 2000ml/hr  - cont pain control with dilaudid 1mg iv q3 hrs prn  - cont to hold home suboxone for now while needs iv opiates      # Continuous alcohol dependence: was with acute withdrawal on presentation, now improved. seen by addiction medicine who noted he is not interested in any detox or program  - cont ativan taper  - He can attend intakes outpt program if he wants after discharge   [phone]      # possible PNA on CXR: afebrile and wbc decreasing  - dc meropenem  - add unasyn for possible aspiration etiology of pna  - f/u open pulm c/s      # Coordination of care:  - VTE ppx with HSQ  - plan of care reviewed with patient, family, RN 33 yo male with pmh that includes continuous alcohol dependence, continuous opiate dependence, last admitted in 2014 for alcoholic pancreatitis, who presented with abdominal pain and found with alcoholic pancreatitis, without gallstones. course is that of improvement in pain. low grade temp resolved      # Acute alcoholic pancreatitis, with one low grade temp that has since resolved. with improving clinical clourse  - full liquid diet today and advance as tolerated  - IVF, decrease rate to 125ml/hr due to development of LE edema  - cont pain control with dilaudid 1mg iv q3 hrs prn  - cont to hold home suboxone for now while needs iv opiates      # Continuous alcohol dependence: was with acute withdrawal on presentation, now improved. seen by addiction medicine who noted he is not interested in any detox or program  - cont ativan taper  - He can attend intakes outpt program if he wants after discharge   [phone]      # possible PNA on CXR: afebrile and wbc decreasing  - dc meropenem  - add unasyn for possible aspiration etiology of pna  - f/u open pulm c/s      # Coordination of care:  - VTE ppx with HSQ  - plan of care reviewed with patient, family, RN

## 2018-07-01 NOTE — PROGRESS NOTE ADULT - SUBJECTIVE AND OBJECTIVE BOX
Chief Complaint: Patient is a 32y old  Male who presents with a chief complaint of abdominal pain (28 Jun 2018 11:48)  f/u etoh pancreatitis  feels bettr today no pain /n/v   afebrile  hungry   wbc decreased, lfts nl        Review Of Systems:    Medications:  acetaminophen   Tablet 650 milliGRAM(s) Oral every 4 hours PRN  folic acid 1 milliGRAM(s) Oral daily  heparin  Injectable 5000 Unit(s) SubCutaneous every 8 hours  HYDROmorphone  Injectable 1 milliGRAM(s) IV Push every 3 hours PRN  lactated ringers. 1000 milliLiter(s) IV Continuous <Continuous>  LORazepam     Tablet   Oral   LORazepam     Tablet 2 milliGRAM(s) Oral every 6 hours PRN  LORazepam     Tablet 0.5 milliGRAM(s) Oral every 4 hours  meropenem  IVPB 1000 milliGRAM(s) IV Intermittent every 8 hours  meropenem  IVPB      ondansetron Injectable 4 milliGRAM(s) IV Push every 8 hours PRN  thiamine 100 milliGRAM(s) Oral daily      PMHX/PSHX:  ETOH abuse  No significant past surgical history      Family history:  No pertinent family history in first degree relatives      Social History:       Allergies:  No Known Allergies            PHYSICAL EXAM:   Vital Signs:  Vital Signs Last 24 Hrs  T(C): 37.7 (01 Jul 2018 13:41), Max: 38 (30 Jun 2018 22:21)  T(F): 99.8 (01 Jul 2018 13:41), Max: 100.4 (30 Jun 2018 22:21)  HR: 105 (01 Jul 2018 13:41) (99 - 116)  BP: 125/68 (01 Jul 2018 13:41) (105/57 - 142/85)  BP(mean): --  RR: 18 (01 Jul 2018 13:41) (16 - 18)  SpO2: 97% (30 Jun 2018 18:29) (97% - 97%)  Daily     Daily     T(C): 37.7 (07-01-18 @ 13:41), Max: 38 (06-30-18 @ 22:21)  HR: 105 (07-01-18 @ 13:41) (99 - 116)  BP: 125/68 (07-01-18 @ 13:41) (105/57 - 142/85)  RR: 18 (07-01-18 @ 13:41) (16 - 18)  SpO2: 97% (06-30-18 @ 18:29) (97% - 97%)    GENERAL:  Appears stated age, well-groomed, well-nourished, no distress  ABDOMEN:  Soft, non-tender, non-distended, normoactive bowel sounds,  no masses ,no hepato-splenomegaly, no signs of chronic liver disease        LABS:                        13.3   12.69 )-----------( 226      ( 01 Jul 2018 07:40 )             39.3     07-01    143  |  101  |  4<L>  ----------------------------<  111<H>  3.6   |  28  |  0.7    Ca    8.7      01 Jul 2018 07:40  Phos  2.4     06-30  Mg     2.2     07-01    TPro  5.7<L>  /  Alb  3.5  /  TBili  0.6  /  DBili  x   /  AST  34  /  ALT  21  /  AlkPhos  43  07-01    LIVER FUNCTIONS - ( 01 Jul 2018 07:40 )  Alb: 3.5 g/dL / Pro: 5.7 g/dL / ALK PHOS: 43 U/L / ALT: 21 U/L / AST: 34 U/L / GGT: x                   Imaging:

## 2018-07-02 LAB
-  AMPICILLIN: SIGNIFICANT CHANGE UP
-  CIPROFLOXACIN: SIGNIFICANT CHANGE UP
-  LEVOFLOXACIN: SIGNIFICANT CHANGE UP
-  NITROFURANTOIN: SIGNIFICANT CHANGE UP
-  TETRACYCLINE: SIGNIFICANT CHANGE UP
-  VANCOMYCIN: SIGNIFICANT CHANGE UP
ALBUMIN SERPL ELPH-MCNC: 3.3 G/DL — LOW (ref 3.5–5.2)
ALP SERPL-CCNC: 43 U/L — SIGNIFICANT CHANGE UP (ref 30–115)
ALT FLD-CCNC: 22 U/L — SIGNIFICANT CHANGE UP (ref 0–41)
ANION GAP SERPL CALC-SCNC: 13 MMOL/L — SIGNIFICANT CHANGE UP (ref 7–14)
AST SERPL-CCNC: 33 U/L — SIGNIFICANT CHANGE UP (ref 0–41)
BILIRUB SERPL-MCNC: 0.5 MG/DL — SIGNIFICANT CHANGE UP (ref 0.2–1.2)
BUN SERPL-MCNC: 3 MG/DL — LOW (ref 10–20)
CALCIUM SERPL-MCNC: 8.5 MG/DL — SIGNIFICANT CHANGE UP (ref 8.5–10.1)
CHLORIDE SERPL-SCNC: 101 MMOL/L — SIGNIFICANT CHANGE UP (ref 98–110)
CO2 SERPL-SCNC: 28 MMOL/L — SIGNIFICANT CHANGE UP (ref 17–32)
CREAT SERPL-MCNC: 0.7 MG/DL — SIGNIFICANT CHANGE UP (ref 0.7–1.5)
CULTURE RESULTS: SIGNIFICANT CHANGE UP
GLUCOSE SERPL-MCNC: 109 MG/DL — HIGH (ref 70–99)
HCT VFR BLD CALC: 34.8 % — LOW (ref 42–52)
HGB BLD-MCNC: 12 G/DL — LOW (ref 14–18)
MAGNESIUM SERPL-MCNC: 2 MG/DL — SIGNIFICANT CHANGE UP (ref 1.8–2.4)
MCHC RBC-ENTMCNC: 31.7 PG — HIGH (ref 27–31)
MCHC RBC-ENTMCNC: 34.5 G/DL — SIGNIFICANT CHANGE UP (ref 32–37)
MCV RBC AUTO: 91.8 FL — SIGNIFICANT CHANGE UP (ref 80–94)
METHOD TYPE: SIGNIFICANT CHANGE UP
NRBC # BLD: 0 /100 WBCS — SIGNIFICANT CHANGE UP (ref 0–0)
ORGANISM # SPEC MICROSCOPIC CNT: SIGNIFICANT CHANGE UP
ORGANISM # SPEC MICROSCOPIC CNT: SIGNIFICANT CHANGE UP
PLATELET # BLD AUTO: 250 K/UL — SIGNIFICANT CHANGE UP (ref 130–400)
POTASSIUM SERPL-MCNC: 3.8 MMOL/L — SIGNIFICANT CHANGE UP (ref 3.5–5)
POTASSIUM SERPL-SCNC: 3.8 MMOL/L — SIGNIFICANT CHANGE UP (ref 3.5–5)
PROT SERPL-MCNC: 5.6 G/DL — LOW (ref 6–8)
RBC # BLD: 3.79 M/UL — LOW (ref 4.7–6.1)
RBC # FLD: 13.7 % — SIGNIFICANT CHANGE UP (ref 11.5–14.5)
SODIUM SERPL-SCNC: 142 MMOL/L — SIGNIFICANT CHANGE UP (ref 135–146)
SPECIMEN SOURCE: SIGNIFICANT CHANGE UP
WBC # BLD: 10.43 K/UL — SIGNIFICANT CHANGE UP (ref 4.8–10.8)
WBC # FLD AUTO: 10.43 K/UL — SIGNIFICANT CHANGE UP (ref 4.8–10.8)

## 2018-07-02 RX ORDER — SODIUM CHLORIDE 9 MG/ML
1000 INJECTION, SOLUTION INTRAVENOUS
Qty: 0 | Refills: 0 | Status: DISCONTINUED | OUTPATIENT
Start: 2018-07-02 | End: 2018-07-02

## 2018-07-02 RX ADMIN — Medication 0.5 MILLIGRAM(S): at 13:50

## 2018-07-02 RX ADMIN — HYDROMORPHONE HYDROCHLORIDE 1 MILLIGRAM(S): 2 INJECTION INTRAMUSCULAR; INTRAVENOUS; SUBCUTANEOUS at 13:39

## 2018-07-02 RX ADMIN — HYDROMORPHONE HYDROCHLORIDE 1 MILLIGRAM(S): 2 INJECTION INTRAMUSCULAR; INTRAVENOUS; SUBCUTANEOUS at 20:24

## 2018-07-02 RX ADMIN — HYDROMORPHONE HYDROCHLORIDE 1 MILLIGRAM(S): 2 INJECTION INTRAMUSCULAR; INTRAVENOUS; SUBCUTANEOUS at 09:30

## 2018-07-02 RX ADMIN — HYDROMORPHONE HYDROCHLORIDE 1 MILLIGRAM(S): 2 INJECTION INTRAMUSCULAR; INTRAVENOUS; SUBCUTANEOUS at 05:50

## 2018-07-02 RX ADMIN — Medication 2 MILLIGRAM(S): at 21:59

## 2018-07-02 RX ADMIN — Medication 100 MILLIGRAM(S): at 11:42

## 2018-07-02 RX ADMIN — Medication 2 MILLIGRAM(S): at 10:13

## 2018-07-02 RX ADMIN — HYDROMORPHONE HYDROCHLORIDE 1 MILLIGRAM(S): 2 INJECTION INTRAMUSCULAR; INTRAVENOUS; SUBCUTANEOUS at 01:42

## 2018-07-02 RX ADMIN — AMPICILLIN SODIUM AND SULBACTAM SODIUM 200 GRAM(S): 250; 125 INJECTION, POWDER, FOR SUSPENSION INTRAMUSCULAR; INTRAVENOUS at 11:41

## 2018-07-02 RX ADMIN — HYDROMORPHONE HYDROCHLORIDE 1 MILLIGRAM(S): 2 INJECTION INTRAMUSCULAR; INTRAVENOUS; SUBCUTANEOUS at 02:00

## 2018-07-02 RX ADMIN — SODIUM CHLORIDE 100 MILLILITER(S): 9 INJECTION, SOLUTION INTRAVENOUS at 10:19

## 2018-07-02 RX ADMIN — AMPICILLIN SODIUM AND SULBACTAM SODIUM 200 GRAM(S): 250; 125 INJECTION, POWDER, FOR SUSPENSION INTRAMUSCULAR; INTRAVENOUS at 23:39

## 2018-07-02 RX ADMIN — HYDROMORPHONE HYDROCHLORIDE 1 MILLIGRAM(S): 2 INJECTION INTRAMUSCULAR; INTRAVENOUS; SUBCUTANEOUS at 09:14

## 2018-07-02 RX ADMIN — HYDROMORPHONE HYDROCHLORIDE 1 MILLIGRAM(S): 2 INJECTION INTRAMUSCULAR; INTRAVENOUS; SUBCUTANEOUS at 12:54

## 2018-07-02 RX ADMIN — Medication 0.5 MILLIGRAM(S): at 05:32

## 2018-07-02 RX ADMIN — HYDROMORPHONE HYDROCHLORIDE 1 MILLIGRAM(S): 2 INJECTION INTRAMUSCULAR; INTRAVENOUS; SUBCUTANEOUS at 05:32

## 2018-07-02 RX ADMIN — AMPICILLIN SODIUM AND SULBACTAM SODIUM 200 GRAM(S): 250; 125 INJECTION, POWDER, FOR SUSPENSION INTRAMUSCULAR; INTRAVENOUS at 00:22

## 2018-07-02 RX ADMIN — AMPICILLIN SODIUM AND SULBACTAM SODIUM 200 GRAM(S): 250; 125 INJECTION, POWDER, FOR SUSPENSION INTRAMUSCULAR; INTRAVENOUS at 17:07

## 2018-07-02 RX ADMIN — Medication 1 MILLIGRAM(S): at 11:42

## 2018-07-02 RX ADMIN — HEPARIN SODIUM 5000 UNIT(S): 5000 INJECTION INTRAVENOUS; SUBCUTANEOUS at 21:46

## 2018-07-02 RX ADMIN — HYDROMORPHONE HYDROCHLORIDE 1 MILLIGRAM(S): 2 INJECTION INTRAMUSCULAR; INTRAVENOUS; SUBCUTANEOUS at 21:10

## 2018-07-02 RX ADMIN — Medication 2 MILLIGRAM(S): at 01:42

## 2018-07-02 RX ADMIN — Medication 650 MILLIGRAM(S): at 20:46

## 2018-07-02 RX ADMIN — AMPICILLIN SODIUM AND SULBACTAM SODIUM 200 GRAM(S): 250; 125 INJECTION, POWDER, FOR SUSPENSION INTRAMUSCULAR; INTRAVENOUS at 05:32

## 2018-07-02 NOTE — CONSULT NOTE ADULT - ASSESSMENT
Impression:  - Acute pancreatitis- improving  - Left lung base opacity    Recommendations:  - D/C IVF if tolerating oral diet  - Cxr PA; Lateral  - BNP; Monitor I/O  - Monitor for withdrawal; Ativan protocol; thiamine; folate  - DVT Px Impression:  - Acute pancreatitis- improving  - Left lung base opacity    Recommendations:  - D/C IVF if tolerating oral diet  ativan protocol   detox consult   -  need Cxr PA; Lateral  - BNP; Monitor I/O  - Monitor for withdrawal; Ativan protocol; thiamine; folate  - DVT Px

## 2018-07-02 NOTE — CONSULT NOTE ADULT - SUBJECTIVE AND OBJECTIVE BOX
Patient is a 32y old  Male who presents with a chief complaint of abdominal pain (2018 11:48)      HPI:  32y old male pmhx below presents to the ED complaining of sudden onset of constant sharp like epigastric abdominal pain scale 10/10 since yesterday radiating to the back and associated with n/v x1 today. pain midly relieved with aleve. pt denies chest pain, sob, headache, fever/ chills, blackouts or seizures. pt admits to current and continous use of alcohol - 2pints vodka/day x5yrs. last use yesterday. pt also states hx of opiods abuse - oxycodone pills. last use 10yrs ago. pt currently on suboxone (2018 11:48)      PAST MEDICAL & SURGICAL HISTORY:  ETOH abuse  No significant past surgical history      SOCIAL HX:   Smoking                         ETOH  active daily     FAMILY HISTORY:  No pertinent family history in first degree relatives      Allergies    No Known Allergies    Intolerances    PHYSICAL EXAM  Vital Signs Last 24 Hrs  T(C): 37.7 (2018 06:09), Max: 37.7 (2018 13:41)  T(F): 99.8 (2018 06:09), Max: 99.8 (2018 13:41)  HR: 94 (2018 06:09) (94 - 111)  BP: 119/63 (2018 06:09) (119/63 - 142/85)  RR: 16 (2018 06:09) (16 - 18)  SpO2: --  I&O's Summary    2018 07:01  -  2018 07:00  --------------------------------------------------------  IN: 2000 mL / OUT: 400 mL / NET: 1600 mL        General: Comfortable in bed  HEENT:  On NC  Lymph node: No palpable LN             Lungs: CTA  Cardiovascular: RRR, S1S2  Abdomen: BS+ve; soft non tender  Extremities:   Neurological:  AAOx3; No focal deficit      LABS:                          12.0   10.43 )-----------( 250      ( 2018 07:34 )             34.8   07-    142  |  101  |  3<L>  ----------------------------<  109<H>  3.8   |  28  |  0.7    Ca    8.5      2018 07:34  Mg     2.0     07-    TPro  5.6<L>  /  Alb  3.3<L>  /  TBili  0.5  /  DBili  x   /  AST  33  /  ALT  22  /  AlkPhos  43  07-02      Urinalysis Basic - ( 2018 15:42 )    Color: Yellow / Appearance: Clear / S.020 / pH: x  Gluc: x / Ketone: Negative  / Bili: Moderate / Urobili: 2.0 mg/dL   Blood: x / Protein: 100 mg/dL / Nitrite: Negative   Leuk Esterase: Negative / RBC: 1-2 /HPF / WBC 1-2 /HPF   Sq Epi: x / Non Sq Epi: Occasional /HPF / Bacteria: Moderate      LIVER FUNCTIONS - ( 2018 07:34 )  Alb: 3.3 g/dL / Pro: 5.6 g/dL / ALK PHOS: 43 U/L / ALT: 22 U/L / AST: 33 U/L / GGT: x                                                  Culture - Blood (collected 2018 18:59)  Source: .Blood None  Preliminary Report (2018 01:02):    No growth to date.    Culture - Urine (collected 2018 15:43)  Source: .Urine Clean Catch (Midstream)  Preliminary Report (2018 18:50):    10,000 - 49,000 CFU/mL Enterococcus faecalis    Culture - Blood (collected 2018 15:30)  Source: .Blood None  Preliminary Report (2018 01:02):    No growth to date.    Lactate (18 @ 08:12): 1.0    MEDICATIONS  (STANDING):  ampicillin/sulbactam  IVPB 3 Gram(s) IV Intermittent every 6 hours  folic acid 1 milliGRAM(s) Oral daily  heparin  Injectable 5000 Unit(s) SubCutaneous every 8 hours  lactated ringers. 1000 milliLiter(s) (125 mL/Hr) IV Continuous <Continuous>  LORazepam     Tablet   Oral   LORazepam     Tablet 0.5 milliGRAM(s) Oral every 4 hours  thiamine 100 milliGRAM(s) Oral daily    MEDICATIONS  (PRN):  acetaminophen   Tablet 650 milliGRAM(s) Oral every 4 hours PRN For Temp greater than 38 C (100.4 F) or mild  pain  HYDROmorphone  Injectable 1 milliGRAM(s) IV Push every 3 hours PRN Moderate Pain (4 - 6)  LORazepam     Tablet 2 milliGRAM(s) Oral every 6 hours PRN alcohol withdrawal symptoms  ondansetron Injectable 4 milliGRAM(s) IV Push every 8 hours PRN Nausea and/or Vomiting      Radiology:   < from: CT Abdomen and Pelvis w/ IV Cont (18 @ 09:02) >  IMPRESSION:     Acute pancreatitis without complications at this time.    < end of copied text >    < from: Xray Chest 1 View-PORTABLE IMMEDIATE (18 @ 15:44) >  Impression:      Left lower lobe opacity/pleural effusion. No air leak.    < end of copied text > Patient is a 32y old  Male who presents with a chief complaint of abdominal pain (2018 11:48)      HPI:  32y old male pmhx below presents to the ED complaining of sudden onset of constant sharp like epigastric abdominal pain scale 10/10 since yesterday radiating to the back and associated with n/v x1 today. pain midly relieved with aleve. pt denies chest pain, sob, headache, fever/ chills, blackouts or seizures. pt admits to current and continous use of alcohol - 2pints vodka/day x5yrs. last use yesterday. pt also states hx of opiods abuse - oxycodone pills. last use 10yrs ago. pt currently on suboxone (2018 11:48)      PAST MEDICAL & SURGICAL HISTORY:  ETOH abuse  No significant past surgical history      SOCIAL HX:   Smoking   smoker                      ETOH  active daily     FAMILY HISTORY:  No pertinent family history in first degree relatives      Allergies    No Known Allergies    Intolerances    PHYSICAL EXAM  Vital Signs Last 24 Hrs  T(C): 37.7 (2018 06:09), Max: 37.7 (2018 13:41)  T(F): 99.8 (2018 06:09), Max: 99.8 (2018 13:41)  HR: 94 (2018 06:09) (94 - 111)  BP: 119/63 (2018 06:09) (119/63 - 142/85)  RR: 16 (2018 06:09) (16 - 18)  SpO2: --  I&O's Summary    2018 07:01  -  2018 07:00  --------------------------------------------------------  IN: 2000 mL / OUT: 400 mL / NET: 1600 mL        General: Comfortable in bed  HEENT:  On RA  Lymph node: No palpable LN             Lungs: B/L BS  Cardiovascular: RRR, S1S2  Abdomen: BS+ve; soft non tender  Extremities: No edema  Neurological:  AAOx3; No focal deficit      LABS:                          12.0   10.43 )-----------( 250      ( 2018 07:34 )             34.8   07-02    142  |  101  |  3<L>  ----------------------------<  109<H>  3.8   |  28  |  0.7    Ca    8.5      2018 07:34  Mg     2.0         TPro  5.6<L>  /  Alb  3.3<L>  /  TBili  0.5  /  DBili  x   /  AST  33  /  ALT  22  /  AlkPhos  43  07      Urinalysis Basic - ( 2018 15:42 )    Color: Yellow / Appearance: Clear / S.020 / pH: x  Gluc: x / Ketone: Negative  / Bili: Moderate / Urobili: 2.0 mg/dL   Blood: x / Protein: 100 mg/dL / Nitrite: Negative   Leuk Esterase: Negative / RBC: 1-2 /HPF / WBC 1-2 /HPF   Sq Epi: x / Non Sq Epi: Occasional /HPF / Bacteria: Moderate      LIVER FUNCTIONS - ( 2018 07:34 )  Alb: 3.3 g/dL / Pro: 5.6 g/dL / ALK PHOS: 43 U/L / ALT: 22 U/L / AST: 33 U/L / GGT: x                                                  Culture - Blood (collected 2018 18:59)  Source: .Blood None  Preliminary Report (2018 01:02):    No growth to date.    Culture - Urine (collected 2018 15:43)  Source: .Urine Clean Catch (Midstream)  Preliminary Report (2018 18:50):    10,000 - 49,000 CFU/mL Enterococcus faecalis    Culture - Blood (collected 2018 15:30)  Source: .Blood None  Preliminary Report (2018 01:02):    No growth to date.    Lactate (18 @ 08:12): 1.0    MEDICATIONS  (STANDING):  ampicillin/sulbactam  IVPB 3 Gram(s) IV Intermittent every 6 hours  folic acid 1 milliGRAM(s) Oral daily  heparin  Injectable 5000 Unit(s) SubCutaneous every 8 hours  lactated ringers. 1000 milliLiter(s) (125 mL/Hr) IV Continuous <Continuous>  LORazepam     Tablet   Oral   LORazepam     Tablet 0.5 milliGRAM(s) Oral every 4 hours  thiamine 100 milliGRAM(s) Oral daily    MEDICATIONS  (PRN):  acetaminophen   Tablet 650 milliGRAM(s) Oral every 4 hours PRN For Temp greater than 38 C (100.4 F) or mild  pain  HYDROmorphone  Injectable 1 milliGRAM(s) IV Push every 3 hours PRN Moderate Pain (4 - 6)  LORazepam     Tablet 2 milliGRAM(s) Oral every 6 hours PRN alcohol withdrawal symptoms  ondansetron Injectable 4 milliGRAM(s) IV Push every 8 hours PRN Nausea and/or Vomiting      Radiology:   < from: CT Abdomen and Pelvis w/ IV Cont (18 @ 09:02) >  IMPRESSION:     Acute pancreatitis without complications at this time.    < end of copied text >    < from: Xray Chest 1 View-PORTABLE IMMEDIATE (18 @ 15:44) >  Impression:      Left lower lobe opacity/pleural effusion. No air leak.    < end of copied text > Patient is a 32y old  Male who presents with a chief complaint of abdominal pain (2018 11:48)      HPI:  32y old male pmhx below presents to the ED complaining of sudden onset of constant sharp like epigastric abdominal pain scale 10/10 since yesterday radiating to the back and associated with n/v x1 today. pain midly relieved with aleve. pt denies chest pain, sob, headache, fever/ chills, blackouts or seizures. pt admits to current and continous use of alcohol - 2pints vodka/day x5yrs. last use yesterday. pt also states hx of opiods abuse - oxycodone pills. last use 10yrs ago. pt currently on suboxone (2018 11:48)  consult was placed for effusion   cxr reviewed patient now comfortable ambulating in the room no sob no cough no wheeze    PAST MEDICAL & SURGICAL HISTORY:  ETOH abuse  No significant past surgical history      SOCIAL HX:   Smoking   smoker                      ETOH  active daily     FAMILY HISTORY:  No pertinent family history in first degree relatives      Allergies    No Known Allergies    Intolerances    PHYSICAL EXAM  Vital Signs Last 24 Hrs  T(C): 37.7 (2018 06:09), Max: 37.7 (2018 13:41)  T(F): 99.8 (2018 06:09), Max: 99.8 (2018 13:41)  HR: 94 (2018 06:09) (94 - 111)  BP: 119/63 (2018 06:09) (119/63 - 142/85)  RR: 16 (2018 06:09) (16 - 18)  SpO2: --  I&O's Summary    2018 07:01  -  2018 07:00  --------------------------------------------------------  IN: 2000 mL / OUT: 400 mL / NET: 1600 mL        General: Comfortable in bed  HEENT:  On RA  Lymph node: No palpable LN             Lungs: B/L BS  Cardiovascular: RRR, S1S2  Abdomen: BS+ve; soft non tender  Extremities: No edema  Neurological:  AAOx3; No focal deficit      LABS:                          12.0   10.43 )-----------( 250      ( 2018 07:34 )             34.8   07-02    142  |  101  |  3<L>  ----------------------------<  109<H>  3.8   |  28  |  0.7    Ca    8.5      2018 07:34  Mg     2.0     07-02    TPro  5.6<L>  /  Alb  3.3<L>  /  TBili  0.5  /  DBili  x   /  AST  33  /  ALT  22  /  AlkPhos  43  07-02      Urinalysis Basic - ( 2018 15:42 )    Color: Yellow / Appearance: Clear / S.020 / pH: x  Gluc: x / Ketone: Negative  / Bili: Moderate / Urobili: 2.0 mg/dL   Blood: x / Protein: 100 mg/dL / Nitrite: Negative   Leuk Esterase: Negative / RBC: 1-2 /HPF / WBC 1-2 /HPF   Sq Epi: x / Non Sq Epi: Occasional /HPF / Bacteria: Moderate      LIVER FUNCTIONS - ( 2018 07:34 )  Alb: 3.3 g/dL / Pro: 5.6 g/dL / ALK PHOS: 43 U/L / ALT: 22 U/L / AST: 33 U/L / GGT: x                                                  Culture - Blood (collected 2018 18:59)  Source: .Blood None  Preliminary Report (2018 01:02):    No growth to date.    Culture - Urine (collected 2018 15:43)  Source: .Urine Clean Catch (Midstream)  Preliminary Report (2018 18:50):    10,000 - 49,000 CFU/mL Enterococcus faecalis    Culture - Blood (collected 2018 15:30)  Source: .Blood None  Preliminary Report (2018 01:02):    No growth to date.    Lactate (18 @ 08:12): 1.0    MEDICATIONS  (STANDING):  ampicillin/sulbactam  IVPB 3 Gram(s) IV Intermittent every 6 hours  folic acid 1 milliGRAM(s) Oral daily  heparin  Injectable 5000 Unit(s) SubCutaneous every 8 hours  lactated ringers. 1000 milliLiter(s) (125 mL/Hr) IV Continuous <Continuous>  LORazepam     Tablet   Oral   LORazepam     Tablet 0.5 milliGRAM(s) Oral every 4 hours  thiamine 100 milliGRAM(s) Oral daily    MEDICATIONS  (PRN):  acetaminophen   Tablet 650 milliGRAM(s) Oral every 4 hours PRN For Temp greater than 38 C (100.4 F) or mild  pain  HYDROmorphone  Injectable 1 milliGRAM(s) IV Push every 3 hours PRN Moderate Pain (4 - 6)  LORazepam     Tablet 2 milliGRAM(s) Oral every 6 hours PRN alcohol withdrawal symptoms  ondansetron Injectable 4 milliGRAM(s) IV Push every 8 hours PRN Nausea and/or Vomiting      Radiology:   < from: CT Abdomen and Pelvis w/ IV Cont (18 @ 09:02) >  IMPRESSION:     Acute pancreatitis without complications at this time.    < end of copied text >    < from: Xray Chest 1 View-PORTABLE IMMEDIATE (18 @ 15:44) >  Impression:      Left lower lobe opacity/pleural effusion. No air leak.    < end of copied text >

## 2018-07-02 NOTE — PROGRESS NOTE ADULT - SUBJECTIVE AND OBJECTIVE BOX
Overnight events/ROS:  feeling better, ambulating  tolerating liquids, advance diet  coughing (order chest xray)      Vital Signs Last 24 Hrs  T(C): 36 (02 Jul 2018 13:57), Max: 37.7 (02 Jul 2018 06:09)  T(F): 96.8 (02 Jul 2018 13:57), Max: 99.8 (02 Jul 2018 06:09)  HR: 92 (02 Jul 2018 13:57) (92 - 99)  BP: 138/80 (02 Jul 2018 13:57) (119/63 - 138/80)  RR: 16 (02 Jul 2018 13:57) (16 - 16)        PHYSICAL EXAM:  GENERAL: NAD, well-groomed, well-developed  HEAD:  Atraumatic, Normocephalic  EYES: EOMI, PERRLA, conjunctiva and sclera clear  NERVOUS SYSTEM:  Alert & Oriented X 4, Good concentration; Motor Strength 5/5 B/L upper and lower extremities; DTRs 2+ intact and symmetric  CHEST/LUNG: decreased breath sounds at bases  HEART: Regular rate and rhythm; No murmurs, rubs, or gallops  ABDOMEN: no guarding, mild epigastric tenderness  EXTREMITIES:  2+ Peripheral Pulses, No clubbing, cyanosis, or edema  SKIN: No rashes or lesions    DATA:      Culture - Urine (collected 30 Jun 2018 15:43)              // asscoated UA noninfectious  Source: .Urine Clean Catch (Midstream)  Preliminary Report (01 Jul 2018 18:50):    10,000 - 49,000 CFU/mL Enterococcus faecalis    Xray Chest 1 View AP/PA (07.02.18 @ 11:33)   Impression:      Right basilar opacity and right perihilar opacity, new. Left basilar   opacity, increased.                              12.0   10.43 )-----------( 250      ( 02 Jul 2018 07:34 )             34.8   07-02    142  |  101  |  3<L>  ----------------------------<  109<H>  3.8   |  28  |  0.7    Ca    8.5      02 Jul 2018 07:34  Mg     2.0     07-02    TPro  5.6<L>  /  Alb  3.3<L>  /  TBili  0.5  /  DBili  x   /  AST  33  /  ALT  22  /  AlkPhos  43  07-02  MEDICATIONS  (STANDING):  ampicillin/sulbactam  IVPB 3 Gram(s) IV Intermittent every 6 hours  folic acid 1 milliGRAM(s) Oral daily  heparin  Injectable 5000 Unit(s) SubCutaneous every 8 hours  lactated ringers. 1000 milliLiter(s) (100 mL/Hr) IV Continuous <Continuous>  thiamine 100 milliGRAM(s) Oral daily    MEDICATIONS  (PRN):  acetaminophen   Tablet 650 milliGRAM(s) Oral every 4 hours PRN For Temp greater than 38 C (100.4 F) or mild  pain  HYDROmorphone  Injectable 1 milliGRAM(s) IV Push every 3 hours PRN Moderate Pain (4 - 6)  LORazepam     Tablet 2 milliGRAM(s) Oral every 6 hours PRN alcohol withdrawal symptoms  ondansetron Injectable 4 milliGRAM(s) IV Push every 8 hours PRN Nausea and/or Vomiting

## 2018-07-02 NOTE — PROGRESS NOTE ADULT - ASSESSMENT
31 yo male with pmh that includes continuous alcohol dependence, continuous opiate dependence, last admitted in 2014 for alcoholic pancreatitis, who presented with abdominal pain and found with alcoholic pancreatitis, without gallstones. course is that of improvement in pain. low grade temp present      # Acute alcoholic pancreatitis  - on full liquid diet, advance diet  - IVF, decrease rate to 100ml/hr.  - cont pain control with dilaudid 1mg iv q3 hrs prn  - cont to hold home suboxone for now while needs iv opiates      # Continuous alcohol dependence: was with acute withdrawal on presentation, now improved. seen by addiction medicine who noted he is not interested in any detox or program  - cont ativan taper  - outpt detox program if he wants after discharge         # PNA on CXR: suspected gram negative PNA  -continue with IV unasyn  - Pulm recommendations noted    # UTI; on abx     # Coordination of care:  - VTE ppx with HSQ  - plan of care reviewed with patient, mother and nursing staff

## 2018-07-03 ENCOUNTER — TRANSCRIPTION ENCOUNTER (OUTPATIENT)
Age: 33
End: 2018-07-03

## 2018-07-03 VITALS
RESPIRATION RATE: 16 BRPM | TEMPERATURE: 98 F | HEART RATE: 85 BPM | DIASTOLIC BLOOD PRESSURE: 86 MMHG | SYSTOLIC BLOOD PRESSURE: 144 MMHG

## 2018-07-03 LAB
ALBUMIN SERPL ELPH-MCNC: 3.7 G/DL — SIGNIFICANT CHANGE UP (ref 3.5–5.2)
ALP SERPL-CCNC: 53 U/L — SIGNIFICANT CHANGE UP (ref 30–115)
ALT FLD-CCNC: 23 U/L — SIGNIFICANT CHANGE UP (ref 0–41)
ANION GAP SERPL CALC-SCNC: 13 MMOL/L — SIGNIFICANT CHANGE UP (ref 7–14)
AST SERPL-CCNC: 25 U/L — SIGNIFICANT CHANGE UP (ref 0–41)
BILIRUB SERPL-MCNC: 0.3 MG/DL — SIGNIFICANT CHANGE UP (ref 0.2–1.2)
BUN SERPL-MCNC: 3 MG/DL — LOW (ref 10–20)
CALCIUM SERPL-MCNC: 8.7 MG/DL — SIGNIFICANT CHANGE UP (ref 8.5–10.1)
CHLORIDE SERPL-SCNC: 101 MMOL/L — SIGNIFICANT CHANGE UP (ref 98–110)
CO2 SERPL-SCNC: 27 MMOL/L — SIGNIFICANT CHANGE UP (ref 17–32)
CREAT SERPL-MCNC: 0.7 MG/DL — SIGNIFICANT CHANGE UP (ref 0.7–1.5)
GLUCOSE SERPL-MCNC: 99 MG/DL — SIGNIFICANT CHANGE UP (ref 70–99)
HCT VFR BLD CALC: 35.7 % — LOW (ref 42–52)
HGB BLD-MCNC: 12.4 G/DL — LOW (ref 14–18)
MCHC RBC-ENTMCNC: 30.9 PG — SIGNIFICANT CHANGE UP (ref 27–31)
MCHC RBC-ENTMCNC: 34.7 G/DL — SIGNIFICANT CHANGE UP (ref 32–37)
MCV RBC AUTO: 89 FL — SIGNIFICANT CHANGE UP (ref 80–94)
NRBC # BLD: 0 /100 WBCS — SIGNIFICANT CHANGE UP (ref 0–0)
PLATELET # BLD AUTO: 304 K/UL — SIGNIFICANT CHANGE UP (ref 130–400)
POTASSIUM SERPL-MCNC: 3.9 MMOL/L — SIGNIFICANT CHANGE UP (ref 3.5–5)
POTASSIUM SERPL-SCNC: 3.9 MMOL/L — SIGNIFICANT CHANGE UP (ref 3.5–5)
PROT SERPL-MCNC: 5.9 G/DL — LOW (ref 6–8)
RBC # BLD: 4.01 M/UL — LOW (ref 4.7–6.1)
RBC # FLD: 13.2 % — SIGNIFICANT CHANGE UP (ref 11.5–14.5)
SODIUM SERPL-SCNC: 141 MMOL/L — SIGNIFICANT CHANGE UP (ref 135–146)
WBC # BLD: 11.63 K/UL — HIGH (ref 4.8–10.8)
WBC # FLD AUTO: 11.63 K/UL — HIGH (ref 4.8–10.8)

## 2018-07-03 RX ORDER — TRAMADOL HYDROCHLORIDE 50 MG/1
1 TABLET ORAL
Qty: 21 | Refills: 0
Start: 2018-07-03 | End: 2018-07-09

## 2018-07-03 RX ORDER — BUPRENORPHINE AND NALOXONE 2; .5 MG/1; MG/1
3 TABLET SUBLINGUAL
Qty: 0 | Refills: 0 | COMMUNITY

## 2018-07-03 RX ADMIN — AMPICILLIN SODIUM AND SULBACTAM SODIUM 200 GRAM(S): 250; 125 INJECTION, POWDER, FOR SUSPENSION INTRAMUSCULAR; INTRAVENOUS at 05:18

## 2018-07-03 RX ADMIN — HEPARIN SODIUM 5000 UNIT(S): 5000 INJECTION INTRAVENOUS; SUBCUTANEOUS at 05:18

## 2018-07-03 RX ADMIN — Medication 100 MILLIGRAM(S): at 10:52

## 2018-07-03 RX ADMIN — HYDROMORPHONE HYDROCHLORIDE 1 MILLIGRAM(S): 2 INJECTION INTRAMUSCULAR; INTRAVENOUS; SUBCUTANEOUS at 11:11

## 2018-07-03 RX ADMIN — HYDROMORPHONE HYDROCHLORIDE 1 MILLIGRAM(S): 2 INJECTION INTRAMUSCULAR; INTRAVENOUS; SUBCUTANEOUS at 00:59

## 2018-07-03 RX ADMIN — HYDROMORPHONE HYDROCHLORIDE 1 MILLIGRAM(S): 2 INJECTION INTRAMUSCULAR; INTRAVENOUS; SUBCUTANEOUS at 10:52

## 2018-07-03 RX ADMIN — HYDROMORPHONE HYDROCHLORIDE 1 MILLIGRAM(S): 2 INJECTION INTRAMUSCULAR; INTRAVENOUS; SUBCUTANEOUS at 01:15

## 2018-07-03 RX ADMIN — Medication 650 MILLIGRAM(S): at 04:23

## 2018-07-03 RX ADMIN — Medication 1 MILLIGRAM(S): at 10:52

## 2018-07-03 RX ADMIN — HYDROMORPHONE HYDROCHLORIDE 1 MILLIGRAM(S): 2 INJECTION INTRAMUSCULAR; INTRAVENOUS; SUBCUTANEOUS at 06:25

## 2018-07-03 RX ADMIN — Medication 2 MILLIGRAM(S): at 04:47

## 2018-07-03 NOTE — DISCHARGE NOTE ADULT - MEDICATION SUMMARY - MEDICATIONS TO STOP TAKING
I will STOP taking the medications listed below when I get home from the hospital:    Suboxone  -- 3 milligram(s) sublingual

## 2018-07-03 NOTE — DISCHARGE NOTE ADULT - PATIENT PORTAL LINK FT
You can access the [facility] Patient Portal, offered by [facility], by registering with the following website: [email or web address]

## 2018-07-03 NOTE — CHART NOTE - NSCHARTNOTEFT_GEN_A_CORE
Registered Dietitian Follow-Up     Patient Profile Reviewed                           Yes [x]   No []     Nutrition History Previously Obtained        Yes [x]  No []       Pertinent Subjective Information: Pt was NPO during initial assessment. As per gastroenterology, Pt was placed on a liquid diet and advanced when medically feasible. Pt reports that he tolerated the liquid diet well however is experiencing stomach pains on the soft diet. EMR reports a 0-75% intake. Pt denies N/V/D.      Pertinent Medical Interventions: Pt came to the ED with abdominal pain. Pt was seen by gastroenterology and dx with ETOH pancreatitis. PMH: ETOH abuse and pancreatitis. Low fat diet education/handout was provided during his initial assessment. Pt reports receiving the education and has no questions about the diet.      Diet order: soft/lowfat     Anthropometrics:  - Ht. 170.18cm  - Wt. 81.6kg  - BMI. 28.2     Pertinent Lab Data: (7/3) H/H 12.4/35.7, BUN 3     Pertinent Meds: thiamine, folic acid     Physical Findings:  - GI function: Pt reports his last BM was a couple of days ago. He denies constipation.   - Skin: Ross Score: 23. Skin intact     Nutrition Requirements  Weight Used: admit 81.6kg     Estimated Energy Needs    Continue [x]  Adjust []  1632-2040kcals/day (20-25kcal/kg)     Estimated Protein Needs    Continue [x]  Adjust []  82-98g/day (1-1.2g/kg)     Estimated Fluid Needs        Continue []  Adjust []  1:1ml/kcal     [x] Previous Nutrition Diagnosis:            [x] Ongoing          [] Resolved     Nutrition Diagnostic #1  Problem: Inadequate oral intake  Etiology: stomach pains due to ETOH pancreatitis   Statement: consuming <50% of his meals once advanced to a soft diet      Nutrition Intervention  Meals and snacks: Continue in a soft diet until medically feasible to advanced   Medical food supplements: Monitor need for supplements. Pt was advanced to a soft diet yesterday (7/2). If the Pt continues to not tolerate a soft diet will offer a supplement.      Goal/Expected Outcome: Consume >75% of meals provided for 3 days     Indicator/Monitoring: Will monitor intake, nutrition focused physical findings, and labs. At risk.

## 2018-07-03 NOTE — DISCHARGE NOTE ADULT - HOSPITAL COURSE
***patient seen and examined at bedside. Spent over 40mins d/c planning, d/c papers and med rec ***    Vital Signs Last 24 Hrs  T(C): 36.9 (03 Jul 2018 06:02), Max: 37.2 (02 Jul 2018 22:00)  T(F): 98.5 (03 Jul 2018 06:02), Max: 98.9 (02 Jul 2018 22:00)  HR: 85 (03 Jul 2018 06:02) (85 - 92)  BP: 144/86 (03 Jul 2018 06:02) (138/80 - 144/86)  RR: 16 (03 Jul 2018 06:02) (16 - 16)    Assessment and Plan:   · Assessment	  33 yo male with pmh that includes continuous alcohol dependence, continuous opiate dependence, last admitted in 2014 for alcoholic pancreatitis, who presented with abdominal pain and found with alcoholic pancreatitis, without gallstones.        # Acute alcoholic pancreatitis  - on full liquid diet, advanced diet yesterday  - switch to oral pain meds  - suboxone on hold      # Continuous alcohol dependence: was with acute withdrawal on presentation, now improved. seen by addiction medicine who noted he is not interested in any detox or program  - outpt detox program if he wants after discharge   [phone]      # PNA on CXR: suspected gram negative PNA  -switch to oral augmentin  - Pulm recommendations noted    # UTI; on abx     # Coordination of care:  - VTE ppx with HSQ  - plan of care reviewed with patient, mother and nursing staff

## 2018-07-03 NOTE — DISCHARGE NOTE ADULT - CARE PROVIDER_API CALL
Brad Jay), Internal Medicine; Pulmonary Disease  [address] [place] [address]  Phone: [phone]  Fax: [phone]

## 2018-07-03 NOTE — DISCHARGE NOTE ADULT - MEDICATION SUMMARY - MEDICATIONS TO TAKE
I will START or STAY ON the medications listed below when I get home from the hospital:    traMADol 50 mg oral tablet  -- 1 tab(s) by mouth 3 times a day, As Needed -for severe pain MDD:3  -- Caution federal law prohibits the transfer of this drug to any person other  than the person for whom it was prescribed.  May cause drowsiness.  Alcohol may intensify this effect.  Use care when operating dangerous machinery.  Obtain medical advice before taking any non-prescription drugs as some may affect the action of this medication.    -- Indication: For for severe pain    amoxicillin-clavulanate 875 mg-125 mg oral tablet  -- 1 tab(s) by mouth 2 times a day for 7 days  -- Finish all this medication unless otherwise directed by prescriber.  Take with food or milk.    -- Indication: For for pneumonia

## 2018-07-06 LAB
CULTURE RESULTS: SIGNIFICANT CHANGE UP
CULTURE RESULTS: SIGNIFICANT CHANGE UP
SPECIMEN SOURCE: SIGNIFICANT CHANGE UP
SPECIMEN SOURCE: SIGNIFICANT CHANGE UP

## 2018-07-09 DIAGNOSIS — N39.0 URINARY TRACT INFECTION, SITE NOT SPECIFIED: ICD-10-CM

## 2018-07-09 DIAGNOSIS — D72.829 ELEVATED WHITE BLOOD CELL COUNT, UNSPECIFIED: ICD-10-CM

## 2018-07-09 DIAGNOSIS — F10.239 ALCOHOL DEPENDENCE WITH WITHDRAWAL, UNSPECIFIED: ICD-10-CM

## 2018-07-09 DIAGNOSIS — K85.20 ALCOHOL INDUCED ACUTE PANCREATITIS WITHOUT NECROSIS OR INFECTION: ICD-10-CM

## 2018-07-09 DIAGNOSIS — I10 ESSENTIAL (PRIMARY) HYPERTENSION: ICD-10-CM

## 2018-07-09 DIAGNOSIS — K85.90 ACUTE PANCREATITIS WITHOUT NECROSIS OR INFECTION, UNSPECIFIED: ICD-10-CM

## 2018-07-09 DIAGNOSIS — N17.9 ACUTE KIDNEY FAILURE, UNSPECIFIED: ICD-10-CM

## 2018-07-09 DIAGNOSIS — J15.6 PNEUMONIA DUE TO OTHER GRAM-NEGATIVE BACTERIA: ICD-10-CM

## 2018-07-09 DIAGNOSIS — Z72.0 TOBACCO USE: ICD-10-CM

## 2018-07-09 DIAGNOSIS — F11.20 OPIOID DEPENDENCE, UNCOMPLICATED: ICD-10-CM

## 2021-01-04 ENCOUNTER — TRANSCRIPTION ENCOUNTER (OUTPATIENT)
Age: 36
End: 2021-01-04

## 2021-04-08 ENCOUNTER — TRANSCRIPTION ENCOUNTER (OUTPATIENT)
Age: 36
End: 2021-04-08

## 2022-10-17 NOTE — ED PROVIDER NOTE - CADM POA URETHRAL CATHETER
FOR REFILL REQUESTS INCLUDING CONTROLLED SUBSTANCES   Please contact your pharmacy at least three (3) business days before your medication runs out.   The pharmacy will then send us a request for your refill.  Please allow 24-48 hours for the refill to be processed.   ?  FOR CONTROLLED SUBSTANCE REFILL REQUESTS   Please call the clinic Monday through Friday, from 8:00am - 5:00pm to speak with a Patient .  ?  LAB AND X-RAY HOURS FOR [address] [place] [address]  Monday - Friday 7 am - 4:30 pm  ?  TEST RESULTS  If your physician has ordered additional laboratory or radiology testing as part of your ongoing plan of care, notification of the test results will be communicated in a timely manner once reviewed by your provider.   - If your results are normal, you will receive a letter in the mail.   - If there are any irregularities, you will receive a phone call from our office within 5 - 7 business days.   - If your results are critical and require more immediate intervention, you will be contacted promptly.   ?  YOUR OPINION MATTERS  You may be receiving a patient satisfaction survey in the mail. We would appreciate it if you could please take the time to complete, as your feedback is very important to us. We strive to make your experience exceptional and your comments help us with that goal. We look forward to hearing from you. Feedback is anonymous unless you choose otherwise.        PRE-OPERATIVE INSTRUCTIONS:    1. Avoid alcohol, NSAID medications, and aspirin 1 week prior to surgery   No

## 2023-03-06 NOTE — CONSULT NOTE ADULT - PROBLEM SELECTOR PROBLEM 1
edside and Verbal shift change report given to Luther Councilman (oncoming nurse) by Ann Grace RN (offgoing nurse). Report included the following information SBAR, Kardex, MAR and Recent Results. SITUATION:   Code Status: Full Code  Reason for Admission: Orthopnea [R06.01]  Hypoglycemia [E16.2]  Flank pain [R10.9]    Hospital day: 2  Problem List:     [unfilled]    BACKGROUND:    Past Medical History:   Past Medical History:   Diagnosis Date    Arthritis     Chest pain     Diabetes ([facility]     Essential hypertension     Headache(784.0)     Hyperchloremia     Hypertension     Seizures ([facility]     8/2020 last seizure    Seizures ([facility] [address]     SOB (shortness of breath)     Thyroid cancer ([facility] [address]          Patient taking anticoagulants  No      ASSESSMENT:   Changes in Assessment Throughout Shift: No    Patient has Central Line:  Midline  Reasons if yes: Access required  Patient has Marshall Cath:  no  Reasons if yes: Bathroom privileges      Last Vitals:   [unfilled]    IV and DRAINS (will only show if present)        WOUND (if present)   Wound Type:  none   Dressing present     Wound Concerns/Notes:  none    PAIN    Pain Assessment                      Interventions for Pain:  none  Intervention effective:  No  Time of last intervention: N/A   Reassessment Completed:  N/A      Last 3 Weights:  [unfilled]  Weight change:     INTAKE/OUPUT    Current Shift: No intake/output data recorded. Last three shifts: 03/04 1901 - 03/06 0700  In: 5016 [P.O.:604;  I.V.:3575]  Out: -     LAB RESULTS     Recent Labs     03/04/23  1438 03/05/23  0231 03/06/23  0109   WBC 9.8 10.5 11.0   HGB 12.5 11.6* 11.4*   HCT 39.2 37.4 36.3    373 406        Recent Labs     03/05/23  0540 03/05/23  1728 03/06/23  0109    135* 135*   K 4.1 4.0 4.0   BUN 11 11 13   MG  --  2.4 2.5       RECOMMENDATIONS AND DISCHARGE PLANNING     Pending tests/procedures/ Plan of Care or Other Needs: See provider notes     Discharge plan for patient and Needs/Barriers: none    Estimated Discharge Date: TBD Posted on Whiteboard in Patients Room:  No       4. The patient's care plan was reviewed with the oncoming nurse. \"HEALS\" SAFETY CHECK      Fall Risk         Safety Measures:      A safety check occurred in the patient's room between off going nurse and oncoming nurse listed above. The safety check included the below items  Area Items   H  High Alert Medications Verify all high alert medication drips (heparin, PCA, etc.)   E  Equipment Suction is set up for ALL patients (with jorge)  Red plugs utilized for all equipment (IV pumps, etc.)  WOWs wiped down at end of shift. Room stocked with oxygen, suction, and other unit-specific supplies   A  Alarms Bed alarm is set for fall risk patients  Ensure chair alarm is in place and activated if patient is up in a chair   L  Lines Check IV for any infiltration  Marshall bag is empty if patient has a Marshall   Tubing and IV bags are labeled   S  Safety   Room is clean, patient is clean, and equipment is clean. Hallways are clear from equipment besides carts. Fall bracelet on for fall risk patients  Ensure room is clear and free of clutter  Suction is set up for ALL patients (with jorge)  Hallways are clear from equipment besides carts.    Isolation precautions followed, supplies available outside room, sign posted     Andreina Tracey RN Alcohol-induced acute pancreatitis, unspecified complication status

## 2023-07-12 NOTE — DISCHARGE NOTE ADULT - CARE PLAN
Contact Information: If you have any questions, concerns, pended studies, tests and/or procedures, or emergencies regarding your inpatient behavioral health visit. Please contact [facility] behavioral health unit and ask to speak to a , nurse or physician. A contact is available 24 hours/ 7 days a week at this number. Summary of Procedures Performed During your Stay:  Below is a list of major procedures performed during your hospital stay and a summary of results:  - Cardiac Procedures/Studies: EKG. Sinus tachycardia, QTC 459ms     If studies are pending at discharge, follow up with your PCP and/or referring provider. Principal Discharge DX:	Alcohol-induced acute pancreatitis, unspecified complication status  Goal:	to be symptom free  Assessment and plan of treatment:	avoid alcohol  Secondary Diagnosis:	Pneumonia  Goal:	to be symptom free  Assessment and plan of treatment:	finish course of antibiotics as prescribed

## 2023-11-27 PROBLEM — F10.10 ALCOHOL ABUSE, UNCOMPLICATED: Chronic | Status: ACTIVE | Noted: 2018-06-28

## 2023-12-22 PROBLEM — Z00.00 ENCOUNTER FOR PREVENTIVE HEALTH EXAMINATION: Status: ACTIVE | Noted: 2023-12-22

## 2024-01-02 PROBLEM — Z72.0 TOBACCO USE: Status: ACTIVE | Noted: 2024-01-02

## 2024-01-02 PROBLEM — R19.7 DIARRHEA: Status: ACTIVE | Noted: 2024-01-02

## 2024-01-02 PROBLEM — Z78.9 PATIENT DENIES MEDICAL PROBLEMS: Status: RESOLVED | Noted: 2024-01-02 | Resolved: 2024-01-02

## 2024-01-02 PROBLEM — R63.4 WEIGHT LOSS: Status: ACTIVE | Noted: 2024-01-02

## 2024-01-02 PROBLEM — Z78.9 DENIES ALCOHOL CONSUMPTION: Status: ACTIVE | Noted: 2024-01-02

## 2024-01-02 PROBLEM — Z80.7 FAMILY HISTORY OF HODGKIN'S LYMPHOMA: Status: ACTIVE | Noted: 2024-01-02

## 2024-01-04 ENCOUNTER — APPOINTMENT (OUTPATIENT)
Dept: GASTROENTEROLOGY | Facility: CLINIC | Age: 39
End: 2024-01-04

## 2024-01-04 DIAGNOSIS — Z78.9 OTHER SPECIFIED HEALTH STATUS: ICD-10-CM

## 2024-01-04 DIAGNOSIS — Z80.7 FAMILY HISTORY OF OTHER MALIGNANT NEOPLASMS OF LYMPHOID, HEMATOPOIETIC AND RELATED TISSUES: ICD-10-CM

## 2024-01-04 DIAGNOSIS — R63.4 ABNORMAL WEIGHT LOSS: ICD-10-CM

## 2024-01-04 DIAGNOSIS — Z72.0 TOBACCO USE: ICD-10-CM

## 2024-01-04 DIAGNOSIS — R19.7 DIARRHEA, UNSPECIFIED: ICD-10-CM

## 2024-01-04 NOTE — REASON FOR VISIT
[Home] : at home, [unfilled] , at the time of the visit. [Medical Office: ([facility])___] : at the medical office located in  [Patient] : the patient [FreeTextEntry4] : Maritza Dwyer  [FreeTextEntry1] : Weight loss/ Weight loss

## 2024-02-12 ENCOUNTER — INPATIENT (INPATIENT)
Facility: HOSPITAL | Age: 39
LOS: 1 days | Discharge: HOME | DRG: 440 | End: 2024-02-14
Attending: STUDENT IN AN ORGANIZED HEALTH CARE EDUCATION/TRAINING PROGRAM | Admitting: HOSPITALIST
Payer: COMMERCIAL

## 2024-02-12 VITALS
HEIGHT: 67 IN | TEMPERATURE: 98 F | OXYGEN SATURATION: 100 % | HEART RATE: 61 BPM | SYSTOLIC BLOOD PRESSURE: 151 MMHG | RESPIRATION RATE: 20 BRPM | WEIGHT: 164.91 LBS | DIASTOLIC BLOOD PRESSURE: 76 MMHG

## 2024-02-12 DIAGNOSIS — K85.90 ACUTE PANCREATITIS WITHOUT NECROSIS OR INFECTION, UNSPECIFIED: ICD-10-CM

## 2024-02-12 LAB
ALBUMIN SERPL ELPH-MCNC: 4.3 G/DL — SIGNIFICANT CHANGE UP (ref 3.5–5.2)
ALP SERPL-CCNC: 54 U/L — SIGNIFICANT CHANGE UP (ref 30–115)
ALT FLD-CCNC: 29 U/L — SIGNIFICANT CHANGE UP (ref 0–41)
ANION GAP SERPL CALC-SCNC: 9 MMOL/L — SIGNIFICANT CHANGE UP (ref 7–14)
APTT BLD: 24.2 SEC — LOW (ref 27–39.2)
AST SERPL-CCNC: 27 U/L — SIGNIFICANT CHANGE UP (ref 0–41)
BASE EXCESS BLDV CALC-SCNC: 8.6 MMOL/L — HIGH (ref -2–3)
BASOPHILS # BLD AUTO: 0.07 K/UL — SIGNIFICANT CHANGE UP (ref 0–0.2)
BASOPHILS NFR BLD AUTO: 0.5 % — SIGNIFICANT CHANGE UP (ref 0–1)
BILIRUB DIRECT SERPL-MCNC: <0.2 MG/DL — SIGNIFICANT CHANGE UP (ref 0–0.3)
BILIRUB INDIRECT FLD-MCNC: >0.2 MG/DL — SIGNIFICANT CHANGE UP (ref 0.2–1.2)
BILIRUB SERPL-MCNC: 0.4 MG/DL — SIGNIFICANT CHANGE UP (ref 0.2–1.2)
BUN SERPL-MCNC: 16 MG/DL — SIGNIFICANT CHANGE UP (ref 10–20)
CA-I SERPL-SCNC: 1.21 MMOL/L — SIGNIFICANT CHANGE UP (ref 1.15–1.33)
CALCIUM SERPL-MCNC: 9.7 MG/DL — SIGNIFICANT CHANGE UP (ref 8.4–10.5)
CHLORIDE SERPL-SCNC: 97 MMOL/L — LOW (ref 98–110)
CO2 SERPL-SCNC: 32 MMOL/L — SIGNIFICANT CHANGE UP (ref 17–32)
CREAT SERPL-MCNC: 0.9 MG/DL — SIGNIFICANT CHANGE UP (ref 0.7–1.5)
EGFR: 113 ML/MIN/1.73M2 — SIGNIFICANT CHANGE UP
EOSINOPHIL # BLD AUTO: 0.49 K/UL — SIGNIFICANT CHANGE UP (ref 0–0.7)
EOSINOPHIL NFR BLD AUTO: 3.3 % — SIGNIFICANT CHANGE UP (ref 0–8)
ETHANOL SERPL-MCNC: <10 MG/DL — SIGNIFICANT CHANGE UP
GAS PNL BLDV: 136 MMOL/L — SIGNIFICANT CHANGE UP (ref 136–145)
GAS PNL BLDV: SIGNIFICANT CHANGE UP
GLUCOSE SERPL-MCNC: 88 MG/DL — SIGNIFICANT CHANGE UP (ref 70–99)
HCO3 BLDV-SCNC: 35 MMOL/L — HIGH (ref 22–29)
HCT VFR BLD CALC: 42.6 % — SIGNIFICANT CHANGE UP (ref 42–52)
HCT VFR BLDA CALC: 44 % — SIGNIFICANT CHANGE UP (ref 39–51)
HGB BLD CALC-MCNC: 14.6 G/DL — SIGNIFICANT CHANGE UP (ref 12.6–17.4)
HGB BLD-MCNC: 14.5 G/DL — SIGNIFICANT CHANGE UP (ref 14–18)
IMM GRANULOCYTES NFR BLD AUTO: 0.9 % — HIGH (ref 0.1–0.3)
INR BLD: 0.81 RATIO — SIGNIFICANT CHANGE UP (ref 0.65–1.3)
LACTATE BLDV-MCNC: 0.9 MMOL/L — SIGNIFICANT CHANGE UP (ref 0.5–2)
LACTATE SERPL-SCNC: 0.7 MMOL/L — SIGNIFICANT CHANGE UP (ref 0.7–2)
LIDOCAIN IGE QN: 742 U/L — HIGH (ref 7–60)
LYMPHOCYTES # BLD AUTO: 19.7 % — LOW (ref 20.5–51.1)
LYMPHOCYTES # BLD AUTO: 2.95 K/UL — SIGNIFICANT CHANGE UP (ref 1.2–3.4)
MAGNESIUM SERPL-MCNC: 2.4 MG/DL — SIGNIFICANT CHANGE UP (ref 1.8–2.4)
MCHC RBC-ENTMCNC: 31.7 PG — HIGH (ref 27–31)
MCHC RBC-ENTMCNC: 34 G/DL — SIGNIFICANT CHANGE UP (ref 32–37)
MCV RBC AUTO: 93.2 FL — SIGNIFICANT CHANGE UP (ref 80–94)
MONOCYTES # BLD AUTO: 1.57 K/UL — HIGH (ref 0.1–0.6)
MONOCYTES NFR BLD AUTO: 10.5 % — HIGH (ref 1.7–9.3)
NEUTROPHILS # BLD AUTO: 9.77 K/UL — HIGH (ref 1.4–6.5)
NEUTROPHILS NFR BLD AUTO: 65.1 % — SIGNIFICANT CHANGE UP (ref 42.2–75.2)
NRBC # BLD: 0 /100 WBCS — SIGNIFICANT CHANGE UP (ref 0–0)
PCO2 BLDV: 54 MMHG — SIGNIFICANT CHANGE UP (ref 42–55)
PH BLDV: 7.42 — SIGNIFICANT CHANGE UP (ref 7.32–7.43)
PLATELET # BLD AUTO: 309 K/UL — SIGNIFICANT CHANGE UP (ref 130–400)
PMV BLD: 9.2 FL — SIGNIFICANT CHANGE UP (ref 7.4–10.4)
PO2 BLDV: 43 MMHG — SIGNIFICANT CHANGE UP (ref 25–45)
POTASSIUM BLDV-SCNC: 3.9 MMOL/L — SIGNIFICANT CHANGE UP (ref 3.5–5.1)
POTASSIUM SERPL-MCNC: 4.1 MMOL/L — SIGNIFICANT CHANGE UP (ref 3.5–5)
POTASSIUM SERPL-SCNC: 4.1 MMOL/L — SIGNIFICANT CHANGE UP (ref 3.5–5)
PROT SERPL-MCNC: 6.8 G/DL — SIGNIFICANT CHANGE UP (ref 6–8)
PROTHROM AB SERPL-ACNC: 9.2 SEC — LOW (ref 9.95–12.87)
RBC # BLD: 4.57 M/UL — LOW (ref 4.7–6.1)
RBC # FLD: 13.9 % — SIGNIFICANT CHANGE UP (ref 11.5–14.5)
SAO2 % BLDV: 62.8 % — LOW (ref 67–88)
SODIUM SERPL-SCNC: 138 MMOL/L — SIGNIFICANT CHANGE UP (ref 135–146)
TRIGL SERPL-MCNC: 153 MG/DL — HIGH
WBC # BLD: 14.98 K/UL — HIGH (ref 4.8–10.8)
WBC # FLD AUTO: 14.98 K/UL — HIGH (ref 4.8–10.8)

## 2024-02-12 PROCEDURE — 80048 BASIC METABOLIC PNL TOTAL CA: CPT

## 2024-02-12 PROCEDURE — 85027 COMPLETE CBC AUTOMATED: CPT

## 2024-02-12 PROCEDURE — 83690 ASSAY OF LIPASE: CPT

## 2024-02-12 PROCEDURE — 74177 CT ABD & PELVIS W/CONTRAST: CPT | Mod: 26,ME

## 2024-02-12 PROCEDURE — 71045 X-RAY EXAM CHEST 1 VIEW: CPT | Mod: 26

## 2024-02-12 PROCEDURE — 99232 SBSQ HOSP IP/OBS MODERATE 35: CPT

## 2024-02-12 PROCEDURE — 36415 COLL VENOUS BLD VENIPUNCTURE: CPT

## 2024-02-12 PROCEDURE — 99285 EMERGENCY DEPT VISIT HI MDM: CPT

## 2024-02-12 PROCEDURE — G1004: CPT

## 2024-02-12 PROCEDURE — 80053 COMPREHEN METABOLIC PANEL: CPT

## 2024-02-12 PROCEDURE — 85025 COMPLETE CBC W/AUTO DIFF WBC: CPT

## 2024-02-12 PROCEDURE — 99222 1ST HOSP IP/OBS MODERATE 55: CPT

## 2024-02-12 RX ORDER — MORPHINE SULFATE 50 MG/1
4 CAPSULE, EXTENDED RELEASE ORAL ONCE
Refills: 0 | Status: DISCONTINUED | OUTPATIENT
Start: 2024-02-12 | End: 2024-02-12

## 2024-02-12 RX ORDER — HYDROMORPHONE HYDROCHLORIDE 2 MG/ML
0.5 INJECTION INTRAMUSCULAR; INTRAVENOUS; SUBCUTANEOUS ONCE
Refills: 0 | Status: DISCONTINUED | OUTPATIENT
Start: 2024-02-12 | End: 2024-02-12

## 2024-02-12 RX ORDER — MORPHINE SULFATE 50 MG/1
1 CAPSULE, EXTENDED RELEASE ORAL ONCE
Refills: 0 | Status: DISCONTINUED | OUTPATIENT
Start: 2024-02-12 | End: 2024-02-12

## 2024-02-12 RX ORDER — SODIUM CHLORIDE 9 MG/ML
2000 INJECTION, SOLUTION INTRAVENOUS ONCE
Refills: 0 | Status: COMPLETED | OUTPATIENT
Start: 2024-02-12 | End: 2024-02-12

## 2024-02-12 RX ORDER — KETOROLAC TROMETHAMINE 30 MG/ML
15 SYRINGE (ML) INJECTION EVERY 6 HOURS
Refills: 0 | Status: DISCONTINUED | OUTPATIENT
Start: 2024-02-12 | End: 2024-02-14

## 2024-02-12 RX ORDER — NICOTINE POLACRILEX 2 MG
1 GUM BUCCAL DAILY
Refills: 0 | Status: DISCONTINUED | OUTPATIENT
Start: 2024-02-12 | End: 2024-02-14

## 2024-02-12 RX ORDER — FAMOTIDINE 10 MG/ML
20 INJECTION INTRAVENOUS ONCE
Refills: 0 | Status: COMPLETED | OUTPATIENT
Start: 2024-02-12 | End: 2024-02-12

## 2024-02-12 RX ORDER — ACETAMINOPHEN 500 MG
650 TABLET ORAL EVERY 6 HOURS
Refills: 0 | Status: DISCONTINUED | OUTPATIENT
Start: 2024-02-12 | End: 2024-02-14

## 2024-02-12 RX ORDER — MORPHINE SULFATE 50 MG/1
2 CAPSULE, EXTENDED RELEASE ORAL EVERY 4 HOURS
Refills: 0 | Status: DISCONTINUED | OUTPATIENT
Start: 2024-02-12 | End: 2024-02-14

## 2024-02-12 RX ORDER — ONDANSETRON 8 MG/1
4 TABLET, FILM COATED ORAL EVERY 6 HOURS
Refills: 0 | Status: DISCONTINUED | OUTPATIENT
Start: 2024-02-12 | End: 2024-02-14

## 2024-02-12 RX ORDER — ONDANSETRON 8 MG/1
4 TABLET, FILM COATED ORAL ONCE
Refills: 0 | Status: COMPLETED | OUTPATIENT
Start: 2024-02-12 | End: 2024-02-12

## 2024-02-12 RX ORDER — SODIUM CHLORIDE 9 MG/ML
1000 INJECTION, SOLUTION INTRAVENOUS
Refills: 0 | Status: DISCONTINUED | OUTPATIENT
Start: 2024-02-12 | End: 2024-02-14

## 2024-02-12 RX ADMIN — SODIUM CHLORIDE 150 MILLILITER(S): 9 INJECTION, SOLUTION INTRAVENOUS at 09:30

## 2024-02-12 RX ADMIN — Medication 15 MILLIGRAM(S): at 21:50

## 2024-02-12 RX ADMIN — MORPHINE SULFATE 2 MILLIGRAM(S): 50 CAPSULE, EXTENDED RELEASE ORAL at 17:12

## 2024-02-12 RX ADMIN — Medication 1 MILLIGRAM(S): at 09:30

## 2024-02-12 RX ADMIN — Medication 1 MILLIGRAM(S): at 23:45

## 2024-02-12 RX ADMIN — SODIUM CHLORIDE 2000 MILLILITER(S): 9 INJECTION, SOLUTION INTRAVENOUS at 05:31

## 2024-02-12 RX ADMIN — MORPHINE SULFATE 2 MILLIGRAM(S): 50 CAPSULE, EXTENDED RELEASE ORAL at 11:56

## 2024-02-12 RX ADMIN — HYDROMORPHONE HYDROCHLORIDE 0.5 MILLIGRAM(S): 2 INJECTION INTRAMUSCULAR; INTRAVENOUS; SUBCUTANEOUS at 07:46

## 2024-02-12 RX ADMIN — Medication 15 MILLIGRAM(S): at 23:09

## 2024-02-12 RX ADMIN — MORPHINE SULFATE 4 MILLIGRAM(S): 50 CAPSULE, EXTENDED RELEASE ORAL at 05:06

## 2024-02-12 RX ADMIN — MORPHINE SULFATE 2 MILLIGRAM(S): 50 CAPSULE, EXTENDED RELEASE ORAL at 23:46

## 2024-02-12 RX ADMIN — ONDANSETRON 4 MILLIGRAM(S): 8 TABLET, FILM COATED ORAL at 05:05

## 2024-02-12 RX ADMIN — MORPHINE SULFATE 1 MILLIGRAM(S): 50 CAPSULE, EXTENDED RELEASE ORAL at 20:54

## 2024-02-12 RX ADMIN — MORPHINE SULFATE 1 MILLIGRAM(S): 50 CAPSULE, EXTENDED RELEASE ORAL at 23:14

## 2024-02-12 RX ADMIN — Medication 15 MILLIGRAM(S): at 09:56

## 2024-02-12 RX ADMIN — FAMOTIDINE 20 MILLIGRAM(S): 10 INJECTION INTRAVENOUS at 05:06

## 2024-02-12 RX ADMIN — MORPHINE SULFATE 4 MILLIGRAM(S): 50 CAPSULE, EXTENDED RELEASE ORAL at 06:43

## 2024-02-12 RX ADMIN — SODIUM CHLORIDE 150 MILLILITER(S): 9 INJECTION, SOLUTION INTRAVENOUS at 21:50

## 2024-02-12 RX ADMIN — Medication 15 MILLIGRAM(S): at 15:09

## 2024-02-12 RX ADMIN — Medication 1 MILLIGRAM(S): at 17:11

## 2024-02-12 NOTE — ED PROVIDER NOTE - PHYSICAL EXAMINATION
VITAL SIGNS: I have reviewed nursing notes and confirm.  CONSTITUTIONAL: 39 yo M laying on stretcher; in no acute distress.  SKIN: Skin exam is warm and dry, no acute rash.  HEAD: Normocephalic; atraumatic.  EYES: Conjunctiva and sclera clear.  ENT: No nasal discharge; airway clear.   CARD: S1, S2 normal; no murmurs, gallops, or rubs. Regular rate and rhythm.  RESP: No wheezes, rales or rhonchi. Speaking in full sentences.   ABD: Normal bowel sounds; soft; non-distended; (+) epigastric TTP; No rebound or guarding. No CVA tenderness.  EXT: Normal ROM. No clubbing, cyanosis or edema.  NEURO: Alert, oriented. Grossly unremarkable. No focal deficits.

## 2024-02-12 NOTE — H&P ADULT - NSHPLABSRESULTS_GEN_ALL_CORE
14.5   14.98 )-----------( 309      ( 12 Feb 2024 05:03 )             42.6       02-12    138  |  97<L>  |  16  ----------------------------<  88  4.1   |  32  |  0.9    Ca    9.7      12 Feb 2024 05:03  Mg     2.4     02-12    TPro  6.8  /  Alb  4.3  /  TBili  0.4  /  DBili  <0.2  /  AST  27  /  ALT  29  /  AlkPhos  54  02-12          Magnesium: 2.4 mg/dL (02-12-24 @ 05:03)          Urinalysis Basic - ( 12 Feb 2024 05:03 )    Color: x / Appearance: x / SG: x / pH: x  Gluc: 88 mg/dL / Ketone: x  / Bili: x / Urobili: x   Blood: x / Protein: x / Nitrite: x   Leuk Esterase: x / RBC: x / WBC x   Sq Epi: x / Non Sq Epi: x / Bacteria: x    PT/INR - ( 12 Feb 2024 05:03 )   PT: 9.20 sec;   INR: 0.81 ratio         PTT - ( 12 Feb 2024 05:03 )  PTT:24.2 sec    Lactate Trend  02-12 @ 05:03 Lactate:0.7     < from: CT Abdomen and Pelvis w/ IV Cont (02.12.24 @ 06:18) >    IMPRESSION:    Diffuse peripancreatic fat stranding likely reflecting pancreatitis.    < end of copied text >

## 2024-02-12 NOTE — H&P ADULT - NSHPPHYSICALEXAM_GEN_ALL_CORE
VITALS:   T(C): 36.5 (02-12-24 @ 04:34), Max: 36.5 (02-12-24 @ 04:34)  HR: 61 (02-12-24 @ 04:34) (61 - 61)  BP: 151/76 (02-12-24 @ 04:34) (151/76 - 151/76)  RR: 20 (02-12-24 @ 04:34) (20 - 20)  SpO2: 100% (02-12-24 @ 04:34) (100% - 100%)    GENERAL: NAD, lying in bed comfortably  HEAD:  Atraumatic, normocephalic  EYES: EOMI, PERRLA, conjunctiva and sclera clear  ENT: Moist mucous membranes  HEART: Regular rate and rhythm, no murmurs, rubs, or gallops  LUNGS: Unlabored respirations.  Clear to auscultation bilaterally,  ABDOMEN: Soft, nontender, nondistended,   EXTREMITIES: normal strength   NERVOUS SYSTEM:  A&Ox3, no focal deficits   SKIN: No rashes or lesions

## 2024-02-12 NOTE — CONSULT NOTE ADULT - ASSESSMENT
39yo m with h/o etoh use d/o, pancreatitis, admitted due to relapse of pancreatitis in context of etoh use. Currently no sx  of acute alcohol withdrawal.    - continue Librium 25 milliGRAM(s) Oral every 4 hours PRN Withdrawal,  - thiamine, folate, MVI

## 2024-02-12 NOTE — ED PROVIDER NOTE - ATTENDING APP SHARED VISIT CONTRIBUTION OF CARE
38-year-old male, history of alcohol abuse, pancreatitis, presents with worsening epigastric pain since yesterday.  Exam shows alert patient in no distress, HEENT NCAT PERRL, neck supple, lungs clear, RR S1S2, abdomen soft + epigastric tenderness +BS, no CCE.

## 2024-02-12 NOTE — H&P ADULT - NS ATTEND AMEND GEN_ALL_CORE FT
36 yo w/ h/o ETOH abuse and pancreatitis p/w abd pain, n/v. He states he was sober for 6 years and relapsed. Last drink two days ago. Presented due to worsening abd pain. Workup shows elevated WBC, lipase, and CT shows acute pancreatitis.    Exam  GENERAL: NAD, lying in bed comfortably  HEAD:  Atraumatic, normocephalic  EYES: no scleral icterus   ENT: Moist mucous membranes  HEART: Regular rate and rhythm, no murmurs, rubs, or gallops  LUNGS: Unlabored respirations.  Clear to auscultation bilaterally,  ABDOMEN: Soft, nontender, nondistended,   EXTREMITIES: normal strength   NERVOUS SYSTEM:  A&Ox3, no focal deficits   SKIN: No rashes or lesions    Plan:  IVF  NPO  Pain control  Addiction c/s

## 2024-02-12 NOTE — ED PROVIDER NOTE - OBJECTIVE STATEMENT
38-year-old male with past medical history of prior alcohol abuse (sober for 5 years) with recent relapse last week, and pancreatitis presents to the ED complaining of nausea, and moderate upper abdominal pain started yesterday and has been worsening. He reports having one episode of NBNB vomiting yesterday. Pain feels similar to prior episode of pancreatitis.  He denies other complaints. Pt denies fever, chills, diarrhea, headache, dizziness, weakness, chest pain, SOB, back pain, LOC, trauma, urinary symptoms, cough, calf pain/swelling, recent travel, recent surgery.

## 2024-02-12 NOTE — ED PROVIDER NOTE - CLINICAL SUMMARY MEDICAL DECISION MAKING FREE TEXT BOX
38-year-old male, history of alcohol abuse, pancreatitis, presents with worsening epigastric pain since yesterday.  Labs noted for WBC 14.9, hemoglobin 14, lipase 742, lactate 0.7.  CT abdomen consistent with pancreatitis.  Given IV fluids, morphine, Zofran and Pepcid.  Will admit.

## 2024-02-12 NOTE — H&P ADULT - ASSESSMENT
38M pmhx pancreatitis etoh abuse presents with epigastric pain radiating to the back for the last 2 days found to have pancreatitis     #acute alcoholic pancreatitis   #etoh abuse with relapse s/p 5 year sobriety   - NPO  - IVF @150cc/hr   - pain control prn   - zofran prn nausea  - patient will need outpatient GI follow up. this is his 3rd episode of pancreatitis and his brother also  of pancreatitis. Should undergo workup for underlying causes.   - addiction medicine consult for relapse   - ciwa / librium prn for withdrawal symptoms  - alcohol level <10 on admission, patient reports last drink 2 days ago. now with mild anxiety.

## 2024-02-12 NOTE — H&P ADULT - HISTORY OF PRESENT ILLNESS
38M pmhx pancreatitis etoh abuse presents with epigastric pain radiating to the back for the last 2 days. Patient states that he was a heavy drinker in the past but was sober for approximately 5 years until he went on vacation last week and began drinking 4-5 beers per day. Patient developed abdominal pain which he reports being similar to his prior episodes of pancreatitis. His last episode of pancreatitis was about 6 years ago. Patient has never been seen by GI. Patient reports that his brother  of pancreatitis. Patient also reports some nausea and one episode of vomiting associated with this episode. Patient states that he has been trying to keep himself NPO and stopped drinking with hopes the pain would resolve, but it has not. Patient is otherwise in his normal states of health, no fevers chills chest pain or shortness of breath. Patient does endorse some anxiety, but does not feel he is actively withdrawing from alcohol.

## 2024-02-12 NOTE — H&P ADULT - NSICDXFAMILYHX_GEN_ALL_CORE_FT
FAMILY HISTORY:  Sibling  Still living? No  Family history of pancreatitis, Age at diagnosis: Age Unknown

## 2024-02-12 NOTE — CONSULT NOTE ADULT - SUBJECTIVE AND OBJECTIVE BOX
CC: "I'm feeling better now".    HPI: 39yo m with h/o etoh use d/o, pancreatitis, admitted due to relapse of pancreatitis in context of him restarting alcohol use after 6 years of abstinence. He states he "never planned quitting alcohol for life" and felt that 6 years was enough to restart drinking in moderation, so he started drinking 4-5 beers a day while on vacation last week. Currently he is motivated for full abstinence. He is not looking for additional addiction tx, thinking that for him it will not going to be a problem to stay sober. He denies current sx of alcohol withdrawal such as tremor, AH/Vh or illusions, and objectively does not appear to be in acute withdrawal.     On MSE: A,Ox3, calm, cooperative, no pma/r, speech NL r/r/v, mood euthymic, affect calm, t/p linear, t/c no si/hi/ah/vh, no delusions, I/J fair.    Vital Signs Last 24 Hrs  T(C): 36.5 (02-12-24 @ 04:34), Max: 36.5 (02-12-24 @ 04:34)  T(F): 97.7 (02-12-24 @ 04:34), Max: 97.7 (02-12-24 @ 04:34)  HR: 61 (02-12-24 @ 04:34) (61 - 61)  BP: 151/76 (02-12-24 @ 04:34) (151/76 - 151/76)  BP(mean): --  RR: 20 (02-12-24 @ 04:34) (20 - 20)  SpO2: 100% (02-12-24 @ 04:34) (100% - 100%)    LABS:                           14.5   14.98 )-----------( 309      ( 12 Feb 2024 05:03 )             42.6   02-12    138  |  97<L>  |  16  ----------------------------<  88  4.1   |  32  |  0.9    Ca    9.7      12 Feb 2024 05:03  Mg     2.4     02-12    TPro  6.8  /  Alb  4.3  /  TBili  0.4  /  DBili  <0.2  /  AST  27  /  ALT  29  /  AlkPhos  54  02-12    MEDICATIONS  (STANDING):  lactated ringers. 1000 milliLiter(s) (150 mL/Hr) IV Continuous <Continuous>  nicotine - 21 mG/24Hr(s) Patch 1 Patch Transdermal daily    MEDICATIONS  (PRN):  acetaminophen     Tablet .. 650 milliGRAM(s) Oral every 6 hours PRN Temp greater or equal to 38C (100.4F), Mild Pain (1 - 3)  chlordiazePOXIDE 25 milliGRAM(s) Oral every 4 hours PRN Withdrawal  ketorolac   Injectable 15 milliGRAM(s) IV Push every 6 hours PRN Moderate Pain (4 - 6)  morphine  - Injectable 2 milliGRAM(s) IV Push every 4 hours PRN Severe Pain (7 - 10)  ondansetron   Disintegrating Tablet 4 milliGRAM(s) Oral every 6 hours PRN Nausea and/or Vomiting

## 2024-02-13 DIAGNOSIS — F10.10 ALCOHOL ABUSE, UNCOMPLICATED: ICD-10-CM

## 2024-02-13 LAB
ALBUMIN SERPL ELPH-MCNC: 3.8 G/DL — SIGNIFICANT CHANGE UP (ref 3.5–5.2)
ALP SERPL-CCNC: 45 U/L — SIGNIFICANT CHANGE UP (ref 30–115)
ALT FLD-CCNC: 20 U/L — SIGNIFICANT CHANGE UP (ref 0–41)
ANION GAP SERPL CALC-SCNC: 10 MMOL/L — SIGNIFICANT CHANGE UP (ref 7–14)
AST SERPL-CCNC: 23 U/L — SIGNIFICANT CHANGE UP (ref 0–41)
BASOPHILS # BLD AUTO: 0.06 K/UL — SIGNIFICANT CHANGE UP (ref 0–0.2)
BASOPHILS NFR BLD AUTO: 0.5 % — SIGNIFICANT CHANGE UP (ref 0–1)
BILIRUB SERPL-MCNC: 0.4 MG/DL — SIGNIFICANT CHANGE UP (ref 0.2–1.2)
BUN SERPL-MCNC: 7 MG/DL — LOW (ref 10–20)
CALCIUM SERPL-MCNC: 9.3 MG/DL — SIGNIFICANT CHANGE UP (ref 8.4–10.5)
CHLORIDE SERPL-SCNC: 103 MMOL/L — SIGNIFICANT CHANGE UP (ref 98–110)
CO2 SERPL-SCNC: 27 MMOL/L — SIGNIFICANT CHANGE UP (ref 17–32)
CREAT SERPL-MCNC: 0.7 MG/DL — SIGNIFICANT CHANGE UP (ref 0.7–1.5)
EGFR: 122 ML/MIN/1.73M2 — SIGNIFICANT CHANGE UP
EOSINOPHIL # BLD AUTO: 0.4 K/UL — SIGNIFICANT CHANGE UP (ref 0–0.7)
EOSINOPHIL NFR BLD AUTO: 3.6 % — SIGNIFICANT CHANGE UP (ref 0–8)
GLUCOSE SERPL-MCNC: 80 MG/DL — SIGNIFICANT CHANGE UP (ref 70–99)
HCT VFR BLD CALC: 38.2 % — LOW (ref 42–52)
HGB BLD-MCNC: 13.4 G/DL — LOW (ref 14–18)
IMM GRANULOCYTES NFR BLD AUTO: 0.7 % — HIGH (ref 0.1–0.3)
LIDOCAIN IGE QN: 350 U/L — HIGH (ref 7–60)
LYMPHOCYTES # BLD AUTO: 2.24 K/UL — SIGNIFICANT CHANGE UP (ref 1.2–3.4)
LYMPHOCYTES # BLD AUTO: 20.3 % — LOW (ref 20.5–51.1)
MCHC RBC-ENTMCNC: 32.4 PG — HIGH (ref 27–31)
MCHC RBC-ENTMCNC: 35.1 G/DL — SIGNIFICANT CHANGE UP (ref 32–37)
MCV RBC AUTO: 92.3 FL — SIGNIFICANT CHANGE UP (ref 80–94)
MONOCYTES # BLD AUTO: 1.14 K/UL — HIGH (ref 0.1–0.6)
MONOCYTES NFR BLD AUTO: 10.4 % — HIGH (ref 1.7–9.3)
NEUTROPHILS # BLD AUTO: 7.09 K/UL — HIGH (ref 1.4–6.5)
NEUTROPHILS NFR BLD AUTO: 64.5 % — SIGNIFICANT CHANGE UP (ref 42.2–75.2)
NRBC # BLD: 0 /100 WBCS — SIGNIFICANT CHANGE UP (ref 0–0)
PLATELET # BLD AUTO: 259 K/UL — SIGNIFICANT CHANGE UP (ref 130–400)
PMV BLD: 9.3 FL — SIGNIFICANT CHANGE UP (ref 7.4–10.4)
POTASSIUM SERPL-MCNC: 4 MMOL/L — SIGNIFICANT CHANGE UP (ref 3.5–5)
POTASSIUM SERPL-SCNC: 4 MMOL/L — SIGNIFICANT CHANGE UP (ref 3.5–5)
PROT SERPL-MCNC: 5.5 G/DL — LOW (ref 6–8)
RBC # BLD: 4.14 M/UL — LOW (ref 4.7–6.1)
RBC # FLD: 13.6 % — SIGNIFICANT CHANGE UP (ref 11.5–14.5)
SODIUM SERPL-SCNC: 140 MMOL/L — SIGNIFICANT CHANGE UP (ref 135–146)
WBC # BLD: 11.01 K/UL — HIGH (ref 4.8–10.8)
WBC # FLD AUTO: 11.01 K/UL — HIGH (ref 4.8–10.8)

## 2024-02-13 PROCEDURE — 99231 SBSQ HOSP IP/OBS SF/LOW 25: CPT

## 2024-02-13 PROCEDURE — 99232 SBSQ HOSP IP/OBS MODERATE 35: CPT

## 2024-02-13 RX ORDER — FOLIC ACID 0.8 MG
1 TABLET ORAL DAILY
Refills: 0 | Status: DISCONTINUED | OUTPATIENT
Start: 2024-02-13 | End: 2024-02-14

## 2024-02-13 RX ORDER — TRAMADOL HYDROCHLORIDE 50 MG/1
25 TABLET ORAL EVERY 8 HOURS
Refills: 0 | Status: DISCONTINUED | OUTPATIENT
Start: 2024-02-13 | End: 2024-02-14

## 2024-02-13 RX ORDER — SIMETHICONE 80 MG/1
80 TABLET, CHEWABLE ORAL THREE TIMES A DAY
Refills: 0 | Status: DISCONTINUED | OUTPATIENT
Start: 2024-02-13 | End: 2024-02-14

## 2024-02-13 RX ORDER — THIAMINE MONONITRATE (VIT B1) 100 MG
100 TABLET ORAL DAILY
Refills: 0 | Status: DISCONTINUED | OUTPATIENT
Start: 2024-02-13 | End: 2024-02-14

## 2024-02-13 RX ADMIN — SIMETHICONE 80 MILLIGRAM(S): 80 TABLET, CHEWABLE ORAL at 22:25

## 2024-02-13 RX ADMIN — Medication 15 MILLIGRAM(S): at 04:09

## 2024-02-13 RX ADMIN — SODIUM CHLORIDE 150 MILLILITER(S): 9 INJECTION, SOLUTION INTRAVENOUS at 11:23

## 2024-02-13 RX ADMIN — MORPHINE SULFATE 2 MILLIGRAM(S): 50 CAPSULE, EXTENDED RELEASE ORAL at 08:21

## 2024-02-13 RX ADMIN — Medication 1 MILLIGRAM(S): at 22:11

## 2024-02-13 RX ADMIN — MORPHINE SULFATE 2 MILLIGRAM(S): 50 CAPSULE, EXTENDED RELEASE ORAL at 12:47

## 2024-02-13 RX ADMIN — Medication 1 MILLIGRAM(S): at 11:22

## 2024-02-13 RX ADMIN — MORPHINE SULFATE 2 MILLIGRAM(S): 50 CAPSULE, EXTENDED RELEASE ORAL at 02:09

## 2024-02-13 RX ADMIN — MORPHINE SULFATE 2 MILLIGRAM(S): 50 CAPSULE, EXTENDED RELEASE ORAL at 18:36

## 2024-02-13 RX ADMIN — Medication 15 MILLIGRAM(S): at 09:13

## 2024-02-13 RX ADMIN — Medication 15 MILLIGRAM(S): at 21:23

## 2024-02-13 RX ADMIN — TRAMADOL HYDROCHLORIDE 25 MILLIGRAM(S): 50 TABLET ORAL at 11:22

## 2024-02-13 RX ADMIN — SODIUM CHLORIDE 150 MILLILITER(S): 9 INJECTION, SOLUTION INTRAVENOUS at 04:43

## 2024-02-13 RX ADMIN — TRAMADOL HYDROCHLORIDE 25 MILLIGRAM(S): 50 TABLET ORAL at 19:57

## 2024-02-13 RX ADMIN — Medication 1 MILLIGRAM(S): at 14:39

## 2024-02-13 RX ADMIN — Medication 15 MILLIGRAM(S): at 15:17

## 2024-02-13 RX ADMIN — Medication 1 MILLIGRAM(S): at 09:28

## 2024-02-13 RX ADMIN — Medication 1 TABLET(S): at 11:22

## 2024-02-13 RX ADMIN — MORPHINE SULFATE 2 MILLIGRAM(S): 50 CAPSULE, EXTENDED RELEASE ORAL at 04:43

## 2024-02-13 RX ADMIN — Medication 100 MILLIGRAM(S): at 11:22

## 2024-02-13 RX ADMIN — Medication 15 MILLIGRAM(S): at 03:08

## 2024-02-13 NOTE — PROGRESS NOTE ADULT - PROBLEM SELECTOR PLAN 1
After evaluation at this time no need for withdrawal protocol. Pts concerns addressed  Pt will be monitored and supportive care provided.  No other changes to medical care plan for withdrawals.  Monitor labs/electrolytes as needed.    -Counseling provided   CATCH team involved for aftercare and pt will follow up with aftercare back to primary care

## 2024-02-13 NOTE — PROGRESS NOTE ADULT - SUBJECTIVE AND OBJECTIVE BOX
JACQUESDONGABBEY LEMONSONY  38y  Male      Patient is a 38y old  Male who presents with a chief complaint of pancreatitis (2024 09:19)      INTERVAL HPI/OVERNIGHT EVENTS:  Pt seen and examined. He had an episode of abdominal pain requiring IV morphine last night. This AM patient said he is doing better and wants to try eating. He states toradol helps more than morphine.       Vital Signs Last 24 Hrs  T(C): 36.3 (2024 13:18), Max: 36.7 (2024 21:07)  T(F): 97.4 (2024 13:18), Max: 98 (2024 21:07)  HR: 75 (2024 13:18) (64 - 75)  BP: 136/74 (2024 13:18) (121/74 - 136/74)  BP(mean): --  RR: 18 (2024 13:18) (18 - 18)  SpO2: 97% (2024 13:18) (97% - 98%)    Parameters below as of 2024 21:07  Patient On (Oxygen Delivery Method): room air        PHYSICAL EXAM:  Gen: NAD, resting in bed  HEENT: Normocephalic, atraumatic  Neck: supple, no lymphadenopathy  CV: Regular rate & regular rhythm  Lungs: CTABL no wheeze  Abdomen: Soft, NTND+ BS present  Ext: Warm, well perfused no CCE  Neuro: non focal, awake, CN II-XII intact   Skin: no rash, no erythema  Psych: no SI, HI, Hallucination     Consultant(s) Notes Reviewed:  [x ] YES  [ ] NO  Care Discussed with Consultants/Other Providers [ x] YES  [ ] NO    LABS:                        13.4   11.01 )-----------( 259      ( 2024 08:16 )             38.2     02-13    140  |  103  |  7<L>  ----------------------------<  80  4.0   |  27  |  0.7    Ca    9.3      2024 08:16  Mg     2.4     02-12    TPro  5.5<L>  /  Alb  3.8  /  TBili  0.4  /  DBili  x   /  AST  23  /  ALT  20  /  AlkPhos  45  02-13    PT/INR - ( 2024 05:03 )   PT: 9.20 sec;   INR: 0.81 ratio         PTT - ( 2024 05:03 )  PTT:24.2 sec      RADIOLOGY & ADDITIONAL TESTS:    Imaging Personally Reviewed:  [ ] YES  [x ] NO    ASSESSMENT AND PLAN:  38M pmhx pancreatitis etoh abuse presents with epigastric pain radiating to the back for the last 2 days found to have pancreatitis     #acute alcoholic pancreatitis   #etoh abuse with relapse s/p 5 year sobriety   - Improving, will advance diet to liquid diet   - IVF @150cc/hr   - pain control prn   - zofran prn nausea  - patient will need outpatient GI follow up. this is his 3rd episode of pancreatitis and his brother also  of pancreatitis. Should undergo workup for underlying causes.   - addiction medicine consult for relapse   - ciwa / librium prn for withdrawal symptoms  - ativan for anxiety bid prn  - alcohol level <10 on admission, patient reports last drink 2 days ago. now with mild anxiety.   - cont toradol, will add tramadol and try to wean off morphine     Dispo: Improving likely d/c 1-2 days pending advancing diet and dc morphine

## 2024-02-13 NOTE — PROGRESS NOTE ADULT - SUBJECTIVE AND OBJECTIVE BOX
Pt interviewed, examined and EMR chart reviewed.    Follow up of Alcohol Dependency. Pt is on librium abuse. Pt is doing better. Pt with no complaint of withdrawal. Pt only 6 days of drinking on vacation      REVIEW OF SYSTEMS:    Constitutional: No fever, weight loss or fatigue  ENT:  No difficulty hearing, tinnitus, vertigo; No sinus or throat pain  Neck: No pain or stiffness  Respiratory: No cough, wheezing, chills or hemoptysis  Cardiovascular: No chest pain, palpitations, shortness of breath, dizziness or leg swelling  Gastrointestinal: No abdominal or epigastric pain. No nausea, vomiting or hematemesis; No diarrhea or constipation. No melena or hematochezia.  Neurological: No headaches, memory loss, loss of strength, numbness or tremors  Musculoskeletal: No joint pain or swelling; No muscle, back or extremity pain      MEDICATIONS  (STANDING):  folic acid 1 milliGRAM(s) Oral daily  lactated ringers. 1000 milliLiter(s) (150 mL/Hr) IV Continuous <Continuous>  multivitamin 1 Tablet(s) Oral daily  nicotine - 21 mG/24Hr(s) Patch 1 Patch Transdermal daily  thiamine 100 milliGRAM(s) Oral daily    MEDICATIONS  (PRN):  acetaminophen     Tablet .. 650 milliGRAM(s) Oral every 6 hours PRN Temp greater or equal to 38C (100.4F), Mild Pain (1 - 3)  chlordiazePOXIDE 25 milliGRAM(s) Oral every 4 hours PRN Withdrawal  ketorolac   Injectable 15 milliGRAM(s) IV Push every 6 hours PRN Moderate Pain (4 - 6)  morphine  - Injectable 2 milliGRAM(s) IV Push every 4 hours PRN Severe Pain (7 - 10)  ondansetron   Disintegrating Tablet 4 milliGRAM(s) Oral every 6 hours PRN Nausea and/or Vomiting      Vital Signs Last 24 Hrs  T(C): 35.8 (13 Feb 2024 05:29), Max: 36.7 (12 Feb 2024 14:11)  T(F): 96.4 (13 Feb 2024 05:29), Max: 98.1 (12 Feb 2024 14:11)  HR: 64 (13 Feb 2024 05:29) (64 - 70)  BP: 130/64 (13 Feb 2024 05:29) (121/74 - 130/64)  BP(mean): --  RR: 18 (13 Feb 2024 05:29) (18 - 18)  SpO2: 98% (12 Feb 2024 21:07) (98% - 98%)    Parameters below as of 12 Feb 2024 21:07  Patient On (Oxygen Delivery Method): room air        PHYSICAL EXAM:    Constitutional: NAD, well-groomed, well-developed  HEENT: PERRLA, EOMI, Normal Hearing,   Neck: No LAD, No JVD  Back: Normal spine flexure, No CVA tenderness  Respiratory: CTAB/L  Cardiovascular: S1 and S2, RRR, no M/G/R  Gastrointestinal: BS+, soft, NT/ND  Extremities: No peripheral edema  Neurological: A/O x 3, no focal deficits    LABS:                        13.4   11.01 )-----------( 259      ( 13 Feb 2024 08:16 )             38.2     02-12    138  |  97<L>  |  16  ----------------------------<  88  4.1   |  32  |  0.9    Ca    9.7      12 Feb 2024 05:03  Mg     2.4     02-12    TPro  6.8  /  Alb  4.3  /  TBili  0.4  /  DBili  <0.2  /  AST  27  /  ALT  29  /  AlkPhos  54  02-12    PT/INR - ( 12 Feb 2024 05:03 )   PT: 9.20 sec;   INR: 0.81 ratio         PTT - ( 12 Feb 2024 05:03 )  PTT:24.2 sec  Urinalysis Basic - ( 12 Feb 2024 05:03 )    Color: x / Appearance: x / SG: x / pH: x  Gluc: 88 mg/dL / Ketone: x  / Bili: x / Urobili: x   Blood: x / Protein: x / Nitrite: x   Leuk Esterase: x / RBC: x / WBC x   Sq Epi: x / Non Sq Epi: x / Bacteria: x      Drug Screen Urine:  Alcohol Level  Alcohol, Blood: <10 mg/dL (02-12-24 @ 05:03)        RADIOLOGY & ADDITIONAL STUDIES:

## 2024-02-14 ENCOUNTER — TRANSCRIPTION ENCOUNTER (OUTPATIENT)
Age: 39
End: 2024-02-14

## 2024-02-14 VITALS
RESPIRATION RATE: 18 BRPM | HEART RATE: 84 BPM | SYSTOLIC BLOOD PRESSURE: 148 MMHG | TEMPERATURE: 98 F | DIASTOLIC BLOOD PRESSURE: 71 MMHG

## 2024-02-14 LAB
ANION GAP SERPL CALC-SCNC: 9 MMOL/L — SIGNIFICANT CHANGE UP (ref 7–14)
BILIRUB SERPL-MCNC: 0.4 MG/DL — SIGNIFICANT CHANGE UP (ref 0.2–1.2)
BUN SERPL-MCNC: 6 MG/DL — LOW (ref 10–20)
CALCIUM SERPL-MCNC: 9.7 MG/DL — SIGNIFICANT CHANGE UP (ref 8.4–10.5)
CHLORIDE SERPL-SCNC: 102 MMOL/L — SIGNIFICANT CHANGE UP (ref 98–110)
CO2 SERPL-SCNC: 29 MMOL/L — SIGNIFICANT CHANGE UP (ref 17–32)
CREAT SERPL-MCNC: 0.7 MG/DL — SIGNIFICANT CHANGE UP (ref 0.7–1.5)
EGFR: 122 ML/MIN/1.73M2 — SIGNIFICANT CHANGE UP
GLUCOSE SERPL-MCNC: 98 MG/DL — SIGNIFICANT CHANGE UP (ref 70–99)
HCT VFR BLD CALC: 39.5 % — LOW (ref 42–52)
HGB BLD-MCNC: 14 G/DL — SIGNIFICANT CHANGE UP (ref 14–18)
INR BLD: 0.93 RATIO — SIGNIFICANT CHANGE UP (ref 0.65–1.3)
MCHC RBC-ENTMCNC: 32.5 PG — HIGH (ref 27–31)
MCHC RBC-ENTMCNC: 35.4 G/DL — SIGNIFICANT CHANGE UP (ref 32–37)
MCV RBC AUTO: 91.6 FL — SIGNIFICANT CHANGE UP (ref 80–94)
MELD SCORE WITH DIALYSIS: 20 POINTS — SIGNIFICANT CHANGE UP
MELD SCORE WITHOUT DIALYSIS: 6 POINTS — SIGNIFICANT CHANGE UP
NRBC # BLD: 0 /100 WBCS — SIGNIFICANT CHANGE UP (ref 0–0)
PLATELET # BLD AUTO: 294 K/UL — SIGNIFICANT CHANGE UP (ref 130–400)
PMV BLD: 9.2 FL — SIGNIFICANT CHANGE UP (ref 7.4–10.4)
POTASSIUM SERPL-MCNC: 4.4 MMOL/L — SIGNIFICANT CHANGE UP (ref 3.5–5)
POTASSIUM SERPL-SCNC: 4.4 MMOL/L — SIGNIFICANT CHANGE UP (ref 3.5–5)
PROTHROM AB SERPL-ACNC: 10.6 SEC — SIGNIFICANT CHANGE UP (ref 9.95–12.87)
RBC # BLD: 4.31 M/UL — LOW (ref 4.7–6.1)
RBC # FLD: 13.5 % — SIGNIFICANT CHANGE UP (ref 11.5–14.5)
SODIUM SERPL-SCNC: 140 MMOL/L — SIGNIFICANT CHANGE UP (ref 135–146)
WBC # BLD: 9.1 K/UL — SIGNIFICANT CHANGE UP (ref 4.8–10.8)
WBC # FLD AUTO: 9.1 K/UL — SIGNIFICANT CHANGE UP (ref 4.8–10.8)

## 2024-02-14 PROCEDURE — 99239 HOSP IP/OBS DSCHRG MGMT >30: CPT

## 2024-02-14 RX ORDER — IBUPROFEN 200 MG
1 TABLET ORAL
Qty: 9 | Refills: 0
Start: 2024-02-14 | End: 2024-02-16

## 2024-02-14 RX ORDER — SIMETHICONE 80 MG/1
1 TABLET, CHEWABLE ORAL
Qty: 12 | Refills: 0
Start: 2024-02-14 | End: 2024-02-16

## 2024-02-14 RX ORDER — SIMETHICONE 80 MG/1
80 TABLET, CHEWABLE ORAL EVERY 6 HOURS
Refills: 0 | Status: DISCONTINUED | OUTPATIENT
Start: 2024-02-14 | End: 2024-02-14

## 2024-02-14 RX ADMIN — Medication 1 MILLIGRAM(S): at 12:52

## 2024-02-14 RX ADMIN — SIMETHICONE 80 MILLIGRAM(S): 80 TABLET, CHEWABLE ORAL at 12:56

## 2024-02-14 RX ADMIN — MORPHINE SULFATE 2 MILLIGRAM(S): 50 CAPSULE, EXTENDED RELEASE ORAL at 07:55

## 2024-02-14 RX ADMIN — HYDROMORPHONE HYDROCHLORIDE 0.5 MILLIGRAM(S): 2 INJECTION INTRAMUSCULAR; INTRAVENOUS; SUBCUTANEOUS at 09:30

## 2024-02-14 RX ADMIN — Medication 650 MILLIGRAM(S): at 07:53

## 2024-02-14 RX ADMIN — Medication 100 MILLIGRAM(S): at 12:53

## 2024-02-14 RX ADMIN — Medication 15 MILLIGRAM(S): at 09:39

## 2024-02-14 RX ADMIN — Medication 1 MILLIGRAM(S): at 09:56

## 2024-02-14 RX ADMIN — MORPHINE SULFATE 2 MILLIGRAM(S): 50 CAPSULE, EXTENDED RELEASE ORAL at 01:21

## 2024-02-14 RX ADMIN — Medication 15 MILLIGRAM(S): at 15:46

## 2024-02-14 RX ADMIN — Medication 650 MILLIGRAM(S): at 09:31

## 2024-02-14 RX ADMIN — Medication 1 TABLET(S): at 12:52

## 2024-02-14 RX ADMIN — Medication 15 MILLIGRAM(S): at 16:17

## 2024-02-14 RX ADMIN — Medication 15 MILLIGRAM(S): at 03:31

## 2024-02-14 NOTE — DISCHARGE NOTE NURSING/CASE MANAGEMENT/SOCIAL WORK - PATIENT PORTAL LINK FT
You can access the FollowMyHealth Patient Portal offered by [facility] by registering at the following website: [email or web address]. By joining [employer]’s FollowMyHealth portal, you will also be able to view your health information using other applications (apps) compatible with our system.

## 2024-02-14 NOTE — PROGRESS NOTE ADULT - ASSESSMENT
38M pmhx pancreatitis etoh abuse presents with epigastric pain radiating to the back for the last 2 days found to have pancreatitis     Acute alcoholic pancreatitis, Recurrent    Etoh abuse with relapse s/p 5 year sobriety   - Improving: diet advanced to soft   - c/w IVF @150cc/hr pain control and antiemetic   - C/w Thiamine, folic acid and  MVI   -Ciwa / librium prn for withdrawal symptoms  - Ativan for anxiety bid prn    patient will need outpatient GI follow up. this is his 3rd episode of pancreatitis and his brother also  of pancreatitis.   Should undergo workup for underlying causes.     DVT PPX: low Risk, ambulation encouraged   GI PPX: feeding   Full code   Dispo: From Home   Possible 24-48: improvement in abdominal pain and toleration of diet

## 2024-02-14 NOTE — DISCHARGE NOTE PROVIDER - CARE PROVIDER_API CALL
Nikko Fisher  Internal Medicine  [address] [place] [address]  Phone: [phone]  Fax: [phone]  Follow Up Time: 1-3 days    Merry Carl  Gastroenterology  [address] [place] [id number]  Phone: [phone]  Fax: [phone]  Follow Up Time:

## 2024-02-14 NOTE — PROGRESS NOTE ADULT - SUBJECTIVE AND OBJECTIVE BOX
Patient is a 38y old  Male who presents with a chief complaint of pancreatitis (13 Feb 2024 14:58)      MEDICATIONS  (STANDING):  folic acid 1 milliGRAM(s) Oral daily  lactated ringers. 1000 milliLiter(s) (150 mL/Hr) IV Continuous <Continuous>  multivitamin 1 Tablet(s) Oral daily  nicotine - 21 mG/24Hr(s) Patch 1 Patch Transdermal daily  thiamine 100 milliGRAM(s) Oral daily    MEDICATIONS  (PRN):  acetaminophen     Tablet .. 650 milliGRAM(s) Oral every 6 hours PRN Temp greater or equal to 38C (100.4F), Mild Pain (1 - 3)  chlordiazePOXIDE 25 milliGRAM(s) Oral every 4 hours PRN Withdrawal  ketorolac   Injectable 15 milliGRAM(s) IV Push every 6 hours PRN Moderate Pain (4 - 6)  LORazepam     Tablet 1 milliGRAM(s) Oral two times a day PRN Anxiety  morphine  - Injectable 2 milliGRAM(s) IV Push every 4 hours PRN Severe Pain (7 - 10)  ondansetron   Disintegrating Tablet 4 milliGRAM(s) Oral every 6 hours PRN Nausea and/or Vomiting  simethicone 80 milliGRAM(s) Chew three times a day PRN Gas  traMADol 25 milliGRAM(s) Oral every 8 hours PRN Severe Pain (7 - 10)      CAPILLARY BLOOD GLUCOSE  I&O's Summary      PHYSICAL EXAM:  Vital Signs Last 24 Hrs  T(C): 35.5 (14 Feb 2024 04:59), Max: 36.3 (13 Feb 2024 13:18)  T(F): 95.9 (14 Feb 2024 04:59), Max: 97.4 (13 Feb 2024 13:18)  HR: 67 (14 Feb 2024 04:59) (64 - 75)  BP: 118/76 (14 Feb 2024 04:59) (118/76 - 136/74)  BP(mean): --  RR: 18 (14 Feb 2024 04:59) (18 - 18)  SpO2: 98% (14 Feb 2024 04:59) (97% - 98%)    Parameters below as of 14 Feb 2024 04:59  Patient On (Oxygen Delivery Method): room air      GENERAL: No acute distress, well-developed  HEAD:  Atraumatic, Normocephalic  EYES: EOMI, PERRLA, conjunctiva and sclera clear  NECK: Supple, no lymphadenopathy, no JVD  CHEST/LUNG: CTAB; No wheezes, rales, or rhonchi  HEART: Regular rate and rhythm; No murmurs, rubs, or gallops  ABDOMEN: Soft with intermittent abdominal pain   EXTREMITIES:  2+ peripheral pulses b/l, No clubbing, cyanosis, or edema  NEUROLOGY: A&O x 3, no focal deficits  SKIN: No rashes or lesions    LABS:                        14.0   9.10  )-----------( 294      ( 14 Feb 2024 09:55 )             39.5     02-14    140  |  102  |  6<L>  ----------------------------<  98  4.4   |  29  |  0.7    Ca    9.7      14 Feb 2024 09:55    TPro  x   /  Alb  x   /  TBili  0.4  /  DBili  x   /  AST  x   /  ALT  x   /  AlkPhos  x   02-14    PT/INR - ( 14 Feb 2024 09:55 )   PT: 10.60 sec;   INR: 0.93 ratio           Urinalysis Basic - ( 14 Feb 2024 09:55 )    Color: x / Appearance: x / SG: x / pH: x  Gluc: 98 mg/dL / Ketone: x  / Bili: x / Urobili: x   Blood: x / Protein: x / Nitrite: x   Leuk Esterase: x / RBC: x / WBC x   Sq Epi: x / Non Sq Epi: x / Bacteria: x

## 2024-02-14 NOTE — DISCHARGE NOTE PROVIDER - CARE PROVIDERS DIRECT ADDRESSES
[email or web address]

## 2024-02-14 NOTE — DISCHARGE NOTE NURSING/CASE MANAGEMENT/SOCIAL WORK - NSDCPEFALRISK_GEN_ALL_CORE
For information on Fall & Injury Prevention, visit: [email or web address] OR  [email or web address] OR  https://www.cdc.gov/steadi/patient.html

## 2024-02-14 NOTE — DISCHARGE NOTE PROVIDER - NSDCCPCAREPLAN_GEN_ALL_CORE_FT
PRINCIPAL DISCHARGE DIAGNOSIS  Diagnosis: Acute pancreatitis  Assessment and Plan of Treatment: CT scan of your abdomen showed Diffuse peripancreatic fat stranding likely reflecting pancreatitis.  Your Symptoms improved, you will be discharged as you have been able to tolerate your diet without any further pain   Follow-up with gastroenetrolisgt for further work up given you diverse family history of panreatitis      SECONDARY DISCHARGE DIAGNOSES  Diagnosis: ETOH abuse  Assessment and Plan of Treatment: Follow-up with resources provided by the addiction medicine team

## 2024-02-14 NOTE — DISCHARGE NOTE PROVIDER - ATTENDING DISCHARGE PHYSICAL EXAMINATION:
GENERAL: No acute distress, well-developed  HEAD:  Atraumatic, Normocephalic  EYES: EOMI, PERRLA, conjunctiva and sclera clear  NECK: Supple, no lymphadenopathy, no JVD  CHEST/LUNG: CTAB; No wheezes, rales, or rhonchi  HEART: Regular rate and rhythm; No murmurs, rubs, or gallops  ABDOMEN: Soft with intermittent abdominal pain   EXTREMITIES:  2+ peripheral pulses b/l, No clubbing, cyanosis, or edema  NEUROLOGY: A&O x 3, no focal deficits  SKIN: No rashes or lesions

## 2024-02-14 NOTE — DISCHARGE NOTE PROVIDER - NSDCMRMEDTOKEN_GEN_ALL_CORE_FT
ibuprofen 800 mg oral tablet: 1 tab(s) orally every 8 hours TAKE WITH FOOD  simethicone 80 mg oral tablet, chewable: 1 tab(s) orally every 6 hours

## 2024-02-14 NOTE — DISCHARGE NOTE PROVIDER - HOSPITAL COURSE
38M pmhx pancreatitis etoh abuse presents with epigastric pain radiating to the back for the last 2 days found to have pancreatitis     Acute alcoholic pancreatitis, Recurrent    Etoh abuse with relapse s/p 5 year sobriety   - Improving: diet advanced to soft   - c/w IVF @150cc/hr pain control and antiemetic   - C/w Thiamine, folic acid and  MVI   -Ciwa / librium prn for withdrawal symptoms  - Ativan for anxiety bid prn    patient will need outpatient GI follow up. this is his 3rd episode of pancreatitis and his brother also  of pancreatitis.   Should undergo workup for underlying causes.

## 2024-02-23 DIAGNOSIS — I10 ESSENTIAL (PRIMARY) HYPERTENSION: ICD-10-CM

## 2024-02-23 DIAGNOSIS — K85.20 ALCOHOL INDUCED ACUTE PANCREATITIS WITHOUT NECROSIS OR INFECTION: ICD-10-CM

## 2024-02-23 DIAGNOSIS — G43.909 MIGRAINE, UNSPECIFIED, NOT INTRACTABLE, WITHOUT STATUS MIGRAINOSUS: ICD-10-CM

## 2024-02-23 DIAGNOSIS — F17.200 NICOTINE DEPENDENCE, UNSPECIFIED, UNCOMPLICATED: ICD-10-CM

## 2024-02-23 DIAGNOSIS — Z88.2 ALLERGY STATUS TO SULFONAMIDES: ICD-10-CM

## 2024-02-23 DIAGNOSIS — Z79.82 LONG TERM (CURRENT) USE OF ASPIRIN: ICD-10-CM

## 2024-09-09 ENCOUNTER — EMERGENCY (EMERGENCY)
Facility: HOSPITAL | Age: 39
LOS: 0 days | Discharge: HOME | End: 2024-09-09
Attending: EMERGENCY MEDICINE
Payer: COMMERCIAL

## 2024-09-09 VITALS
WEIGHT: 164.91 LBS | RESPIRATION RATE: 18 BRPM | DIASTOLIC BLOOD PRESSURE: 76 MMHG | SYSTOLIC BLOOD PRESSURE: 142 MMHG | HEIGHT: 67 IN | TEMPERATURE: 98 F | HEART RATE: 80 BPM | OXYGEN SATURATION: 99 %

## 2024-09-09 VITALS
DIASTOLIC BLOOD PRESSURE: 78 MMHG | OXYGEN SATURATION: 100 % | SYSTOLIC BLOOD PRESSURE: 136 MMHG | RESPIRATION RATE: 16 BRPM | HEART RATE: 54 BPM | TEMPERATURE: 98 F

## 2024-09-09 DIAGNOSIS — Z87.19 PERSONAL HISTORY OF OTHER DISEASES OF THE DIGESTIVE SYSTEM: ICD-10-CM

## 2024-09-09 DIAGNOSIS — R10.13 EPIGASTRIC PAIN: ICD-10-CM

## 2024-09-09 DIAGNOSIS — R00.1 BRADYCARDIA, UNSPECIFIED: ICD-10-CM

## 2024-09-09 DIAGNOSIS — F17.200 NICOTINE DEPENDENCE, UNSPECIFIED, UNCOMPLICATED: ICD-10-CM

## 2024-09-09 PROBLEM — K85.90 ACUTE PANCREATITIS WITHOUT NECROSIS OR INFECTION, UNSPECIFIED: Chronic | Status: ACTIVE | Noted: 2024-02-12

## 2024-09-09 LAB
ALBUMIN SERPL ELPH-MCNC: 4.6 G/DL — SIGNIFICANT CHANGE UP (ref 3.5–5.2)
ALP SERPL-CCNC: 57 U/L — SIGNIFICANT CHANGE UP (ref 30–115)
ALT FLD-CCNC: 31 U/L — SIGNIFICANT CHANGE UP (ref 0–41)
ANION GAP SERPL CALC-SCNC: 8 MMOL/L — SIGNIFICANT CHANGE UP (ref 7–14)
APPEARANCE UR: CLEAR — SIGNIFICANT CHANGE UP
APTT BLD: 27 SEC — SIGNIFICANT CHANGE UP (ref 27–39.2)
AST SERPL-CCNC: 34 U/L — SIGNIFICANT CHANGE UP (ref 0–41)
BASOPHILS # BLD AUTO: 0.06 K/UL — SIGNIFICANT CHANGE UP (ref 0–0.2)
BASOPHILS NFR BLD AUTO: 0.4 % — SIGNIFICANT CHANGE UP (ref 0–1)
BILIRUB DIRECT SERPL-MCNC: <0.2 MG/DL — SIGNIFICANT CHANGE UP (ref 0–0.3)
BILIRUB INDIRECT FLD-MCNC: >0.2 MG/DL — SIGNIFICANT CHANGE UP (ref 0.2–1.2)
BILIRUB SERPL-MCNC: 0.4 MG/DL — SIGNIFICANT CHANGE UP (ref 0.2–1.2)
BILIRUB UR-MCNC: NEGATIVE — SIGNIFICANT CHANGE UP
BUN SERPL-MCNC: 9 MG/DL — LOW (ref 10–20)
CALCIUM SERPL-MCNC: 10.1 MG/DL — SIGNIFICANT CHANGE UP (ref 8.4–10.5)
CHLORIDE SERPL-SCNC: 99 MMOL/L — SIGNIFICANT CHANGE UP (ref 98–110)
CO2 SERPL-SCNC: 30 MMOL/L — SIGNIFICANT CHANGE UP (ref 17–32)
COLOR SPEC: YELLOW — SIGNIFICANT CHANGE UP
CREAT SERPL-MCNC: 0.8 MG/DL — SIGNIFICANT CHANGE UP (ref 0.7–1.5)
DIFF PNL FLD: NEGATIVE — SIGNIFICANT CHANGE UP
EGFR: 117 ML/MIN/1.73M2 — SIGNIFICANT CHANGE UP
EOSINOPHIL # BLD AUTO: 0.23 K/UL — SIGNIFICANT CHANGE UP (ref 0–0.7)
EOSINOPHIL NFR BLD AUTO: 1.6 % — SIGNIFICANT CHANGE UP (ref 0–8)
ETHANOL SERPL-MCNC: <10 MG/DL — SIGNIFICANT CHANGE UP
GLUCOSE SERPL-MCNC: 107 MG/DL — HIGH (ref 70–99)
GLUCOSE UR QL: NEGATIVE MG/DL — SIGNIFICANT CHANGE UP
HCT VFR BLD CALC: 44.7 % — SIGNIFICANT CHANGE UP (ref 42–52)
HGB BLD-MCNC: 15.3 G/DL — SIGNIFICANT CHANGE UP (ref 14–18)
IMM GRANULOCYTES NFR BLD AUTO: 0.5 % — HIGH (ref 0.1–0.3)
INR BLD: 0.85 RATIO — SIGNIFICANT CHANGE UP (ref 0.65–1.3)
KETONES UR-MCNC: NEGATIVE MG/DL — SIGNIFICANT CHANGE UP
LACTATE SERPL-SCNC: 1 MMOL/L — SIGNIFICANT CHANGE UP (ref 0.7–2)
LDH SERPL L TO P-CCNC: 200 U/L — SIGNIFICANT CHANGE UP (ref 50–242)
LEUKOCYTE ESTERASE UR-ACNC: NEGATIVE — SIGNIFICANT CHANGE UP
LIDOCAIN IGE QN: 145 U/L — HIGH (ref 7–60)
LYMPHOCYTES # BLD AUTO: 1.93 K/UL — SIGNIFICANT CHANGE UP (ref 1.2–3.4)
LYMPHOCYTES # BLD AUTO: 13.1 % — LOW (ref 20.5–51.1)
MAGNESIUM SERPL-MCNC: 2.1 MG/DL — SIGNIFICANT CHANGE UP (ref 1.8–2.4)
MCHC RBC-ENTMCNC: 32.8 PG — HIGH (ref 27–31)
MCHC RBC-ENTMCNC: 34.2 G/DL — SIGNIFICANT CHANGE UP (ref 32–37)
MCV RBC AUTO: 95.7 FL — HIGH (ref 80–94)
MONOCYTES # BLD AUTO: 1.36 K/UL — HIGH (ref 0.1–0.6)
MONOCYTES NFR BLD AUTO: 9.3 % — SIGNIFICANT CHANGE UP (ref 1.7–9.3)
NEUTROPHILS # BLD AUTO: 11.03 K/UL — HIGH (ref 1.4–6.5)
NEUTROPHILS NFR BLD AUTO: 75.1 % — SIGNIFICANT CHANGE UP (ref 42.2–75.2)
NITRITE UR-MCNC: NEGATIVE — SIGNIFICANT CHANGE UP
NRBC # BLD: 0 /100 WBCS — SIGNIFICANT CHANGE UP (ref 0–0)
PH UR: 7 — SIGNIFICANT CHANGE UP (ref 5–8)
PLATELET # BLD AUTO: 321 K/UL — SIGNIFICANT CHANGE UP (ref 130–400)
PMV BLD: 9 FL — SIGNIFICANT CHANGE UP (ref 7.4–10.4)
POTASSIUM SERPL-MCNC: 5 MMOL/L — SIGNIFICANT CHANGE UP (ref 3.5–5)
POTASSIUM SERPL-SCNC: 5 MMOL/L — SIGNIFICANT CHANGE UP (ref 3.5–5)
PROT SERPL-MCNC: 6.6 G/DL — SIGNIFICANT CHANGE UP (ref 6–8)
PROT UR-MCNC: NEGATIVE MG/DL — SIGNIFICANT CHANGE UP
PROTHROM AB SERPL-ACNC: 9.7 SEC — LOW (ref 9.95–12.87)
RBC # BLD: 4.67 M/UL — LOW (ref 4.7–6.1)
RBC # FLD: 12.9 % — SIGNIFICANT CHANGE UP (ref 11.5–14.5)
SODIUM SERPL-SCNC: 137 MMOL/L — SIGNIFICANT CHANGE UP (ref 135–146)
SP GR SPEC: <1.005 — LOW (ref 1–1.03)
TRIGL SERPL-MCNC: 125 MG/DL — SIGNIFICANT CHANGE UP
UROBILINOGEN FLD QL: 0.2 MG/DL — SIGNIFICANT CHANGE UP (ref 0.2–1)
WBC # BLD: 14.68 K/UL — HIGH (ref 4.8–10.8)
WBC # FLD AUTO: 14.68 K/UL — HIGH (ref 4.8–10.8)

## 2024-09-09 PROCEDURE — 80048 BASIC METABOLIC PNL TOTAL CA: CPT

## 2024-09-09 PROCEDURE — 80076 HEPATIC FUNCTION PANEL: CPT

## 2024-09-09 PROCEDURE — 96375 TX/PRO/DX INJ NEW DRUG ADDON: CPT

## 2024-09-09 PROCEDURE — 96376 TX/PRO/DX INJ SAME DRUG ADON: CPT

## 2024-09-09 PROCEDURE — 83605 ASSAY OF LACTIC ACID: CPT

## 2024-09-09 PROCEDURE — 36415 COLL VENOUS BLD VENIPUNCTURE: CPT

## 2024-09-09 PROCEDURE — 74177 CT ABD & PELVIS W/CONTRAST: CPT | Mod: 26,MC

## 2024-09-09 PROCEDURE — 83615 LACTATE (LD) (LDH) ENZYME: CPT

## 2024-09-09 PROCEDURE — 84478 ASSAY OF TRIGLYCERIDES: CPT

## 2024-09-09 PROCEDURE — 99285 EMERGENCY DEPT VISIT HI MDM: CPT

## 2024-09-09 PROCEDURE — 85610 PROTHROMBIN TIME: CPT

## 2024-09-09 PROCEDURE — 74177 CT ABD & PELVIS W/CONTRAST: CPT | Mod: MC

## 2024-09-09 PROCEDURE — 93005 ELECTROCARDIOGRAM TRACING: CPT

## 2024-09-09 PROCEDURE — 99285 EMERGENCY DEPT VISIT HI MDM: CPT | Mod: 25

## 2024-09-09 PROCEDURE — 83690 ASSAY OF LIPASE: CPT

## 2024-09-09 PROCEDURE — 85025 COMPLETE CBC W/AUTO DIFF WBC: CPT

## 2024-09-09 PROCEDURE — 96374 THER/PROPH/DIAG INJ IV PUSH: CPT | Mod: XU

## 2024-09-09 PROCEDURE — 83735 ASSAY OF MAGNESIUM: CPT

## 2024-09-09 PROCEDURE — 93010 ELECTROCARDIOGRAM REPORT: CPT

## 2024-09-09 PROCEDURE — 80307 DRUG TEST PRSMV CHEM ANLYZR: CPT

## 2024-09-09 PROCEDURE — 85730 THROMBOPLASTIN TIME PARTIAL: CPT

## 2024-09-09 RX ORDER — FAMOTIDINE 10 MG/ML
20 INJECTION INTRAVENOUS ONCE
Refills: 0 | Status: COMPLETED | OUTPATIENT
Start: 2024-09-09 | End: 2024-09-09

## 2024-09-09 RX ORDER — ONDANSETRON 2 MG/ML
1 INJECTION, SOLUTION INTRAMUSCULAR; INTRAVENOUS
Qty: 15 | Refills: 0
Start: 2024-09-09

## 2024-09-09 RX ORDER — FAMOTIDINE 10 MG/ML
1 INJECTION INTRAVENOUS
Qty: 28 | Refills: 0
Start: 2024-09-09

## 2024-09-09 RX ORDER — TRAMADOL HYDROCHLORIDE AND ACETAMINOPHEN 37.5; 325 MG/1; MG/1
1 TABLET, FILM COATED ORAL
Qty: 15 | Refills: 0
Start: 2024-09-09

## 2024-09-09 RX ADMIN — Medication 4 MILLIGRAM(S): at 08:10

## 2024-09-09 RX ADMIN — FAMOTIDINE 20 MILLIGRAM(S): 10 INJECTION INTRAVENOUS at 06:25

## 2024-09-09 RX ADMIN — Medication 4 MILLIGRAM(S): at 07:43

## 2024-09-09 RX ADMIN — Medication 4 MILLIGRAM(S): at 06:16

## 2024-09-09 RX ADMIN — Medication 2000 MILLILITER(S): at 06:18

## 2024-09-09 RX ADMIN — Medication 4 MILLIGRAM(S): at 07:38

## 2024-09-09 NOTE — ED ADULT NURSE NOTE - NS ED NURSE DISCH DISPOSITION
Hide Include Location In Plan Question?: No Include Location In Plan?: Yes Detail Level: Generalized Detail Level: Zone Discharged

## 2024-09-09 NOTE — ED PROVIDER NOTE - NSFOLLOWUPINSTRUCTIONS_ED_ALL_ED_FT
Take medication as prescribed to control symptoms.  Clear liquid diet for the next 3 days.  Return to the ED if your symptoms worsen.  Follow-up with a primary care doctor and a gastroenterologist.  Outpatient navigators will assist you with this task.    Our Emergency Department Referral Coordinators will be reaching out to you in the next 24-48 hours from 9:00am to 5:00pm to schedule a follow up appointment. Please expect a phone call from the hospital in that time frame. If you do not receive a call or if you have any questions or concerns, you can reach them at   [phone].

## 2024-09-09 NOTE — ED PROVIDER NOTE - CLINICAL SUMMARY MEDICAL DECISION MAKING FREE TEXT BOX
Diagnostic testing reviewed.  There is a minimal elevation in the WBC count and in the lipase.  CT shows no sign of pancreatitis.  Patient had a good response to ED treatment.  In my opinion, outpatient follow-up and treatment are medically appropriate.  Strict return precautions discussed with patient.

## 2024-09-09 NOTE — ED PROVIDER NOTE - OBJECTIVE STATEMENT
38-year-old male with past medical history of prior alcohol abuse and pancreatitis presents ED complaining of persistent upper abdominal pain x 2 days.  Pain is sharp, constant, radiates to his back and feels the same as prior episode of pancreatitis.  He had a few drinks the day prior to symptom onset.  He denies other complaints. Pt denies fever, chills, nausea, vomiting, diarrhea, headache, dizziness, weakness, chest pain, SOB, LOC, trauma, urinary symptoms, cough, calf pain/swelling, recent travel, recent surgery.

## 2024-09-09 NOTE — ED PROVIDER NOTE - PATIENT PORTAL LINK FT
You can access the FollowMyHealth Patient Portal offered by [facility] by registering at the following website: [email or web address]. By joining [employer]’s FollowMyHealth portal, you will also be able to view your health information using other applications (apps) compatible with our system.

## 2024-09-09 NOTE — ED PROVIDER NOTE - ATTENDING APP SHARED VISIT CONTRIBUTION OF CARE
38-year-old male, history of pancreatitis, presents with epigastric pain radiating to the back since yesterday.  Admits to drinking few beers prior to symptoms.  No fever or vomiting.  Exam shows alert patient in no distress, HEENT NCAT PERRL, neck supple, lungs clear, RR S1S2, abdomen soft +epigastric tenderness +BS, no CCE.

## 2024-09-09 NOTE — ED PROVIDER NOTE - NSPTACCESSSVCSAPPTDETAILS_ED_ALL_ED_FT
Patient complains of abdominal pain.  He has a history of pancreatitis.  Optimal follow-up within 3 weeks.  Also needs a primary doctor.

## 2024-10-31 NOTE — CONSULT NOTE ADULT - ASSESSMENT
Patient with acute alcoholic pancreatitis   1) NPO  2) Pain control  30 IV hydration Quality 130: Documentation Of Current Medications In The Medical Record: Current Medications Documented Detail Level: Detailed Quality 410: Psoriasis Clinical Response To Oral Systemic Or Biologic Medications: Psoriasis Assessment Tool Documented, Met Specified Benchmark Quality 226: Preventive Care And Screening: Tobacco Use: Screening And Cessation Intervention: Tobacco Screening not Performed

## 2025-04-28 ENCOUNTER — INPATIENT (INPATIENT)
Facility: HOSPITAL | Age: 40
LOS: 2 days | Discharge: HOME | DRG: 395 | End: 2025-05-01
Attending: INTERNAL MEDICINE | Admitting: INTERNAL MEDICINE
Payer: COMMERCIAL

## 2025-04-28 VITALS
OXYGEN SATURATION: 99 % | SYSTOLIC BLOOD PRESSURE: 158 MMHG | WEIGHT: 175.05 LBS | TEMPERATURE: 97 F | RESPIRATION RATE: 18 BRPM | HEART RATE: 87 BPM | DIASTOLIC BLOOD PRESSURE: 86 MMHG

## 2025-04-28 DIAGNOSIS — K35.80 UNSPECIFIED ACUTE APPENDICITIS: ICD-10-CM

## 2025-04-28 DIAGNOSIS — F10.20 ALCOHOL DEPENDENCE, UNCOMPLICATED: ICD-10-CM

## 2025-04-28 LAB
ALBUMIN SERPL ELPH-MCNC: 4.6 G/DL — SIGNIFICANT CHANGE UP (ref 3.5–5.2)
ALP SERPL-CCNC: 83 U/L — SIGNIFICANT CHANGE UP (ref 30–115)
ALT FLD-CCNC: 51 U/L — HIGH (ref 0–41)
ANION GAP SERPL CALC-SCNC: 12 MMOL/L — SIGNIFICANT CHANGE UP (ref 7–14)
AST SERPL-CCNC: 49 U/L — HIGH (ref 0–41)
BASOPHILS # BLD AUTO: 0.07 K/UL — SIGNIFICANT CHANGE UP (ref 0–0.2)
BASOPHILS NFR BLD AUTO: 0.6 % — SIGNIFICANT CHANGE UP (ref 0–1)
BILIRUB SERPL-MCNC: <0.2 MG/DL — SIGNIFICANT CHANGE UP (ref 0.2–1.2)
BUN SERPL-MCNC: 22 MG/DL — HIGH (ref 10–20)
CALCIUM SERPL-MCNC: 9.4 MG/DL — SIGNIFICANT CHANGE UP (ref 8.4–10.5)
CHLORIDE SERPL-SCNC: 96 MMOL/L — LOW (ref 98–110)
CO2 SERPL-SCNC: 32 MMOL/L — SIGNIFICANT CHANGE UP (ref 17–32)
CREAT SERPL-MCNC: 0.8 MG/DL — SIGNIFICANT CHANGE UP (ref 0.7–1.5)
EGFR: 116 ML/MIN/1.73M2 — SIGNIFICANT CHANGE UP
EGFR: 116 ML/MIN/1.73M2 — SIGNIFICANT CHANGE UP
EOSINOPHIL # BLD AUTO: 0.35 K/UL — SIGNIFICANT CHANGE UP (ref 0–0.7)
EOSINOPHIL NFR BLD AUTO: 2.9 % — SIGNIFICANT CHANGE UP (ref 0–8)
ETHANOL SERPL-MCNC: <10 MG/DL — SIGNIFICANT CHANGE UP
GLUCOSE SERPL-MCNC: 86 MG/DL — SIGNIFICANT CHANGE UP (ref 70–99)
HCT VFR BLD CALC: 46.5 % — SIGNIFICANT CHANGE UP (ref 42–52)
HGB BLD-MCNC: 15.6 G/DL — SIGNIFICANT CHANGE UP (ref 14–18)
IMM GRANULOCYTES NFR BLD AUTO: 1.6 % — HIGH (ref 0.1–0.3)
LACTATE SERPL-SCNC: 1 MMOL/L — SIGNIFICANT CHANGE UP (ref 0.7–2)
LIDOCAIN IGE QN: 399 U/L — HIGH (ref 7–60)
LYMPHOCYTES # BLD AUTO: 19.1 % — LOW (ref 20.5–51.1)
LYMPHOCYTES # BLD AUTO: 2.33 K/UL — SIGNIFICANT CHANGE UP (ref 1.2–3.4)
MCHC RBC-ENTMCNC: 32.6 PG — HIGH (ref 27–31)
MCHC RBC-ENTMCNC: 33.5 G/DL — SIGNIFICANT CHANGE UP (ref 32–37)
MCV RBC AUTO: 97.3 FL — HIGH (ref 80–94)
MONOCYTES # BLD AUTO: 1.59 K/UL — HIGH (ref 0.1–0.6)
MONOCYTES NFR BLD AUTO: 13.1 % — HIGH (ref 1.7–9.3)
NEUTROPHILS # BLD AUTO: 7.63 K/UL — HIGH (ref 1.4–6.5)
NEUTROPHILS NFR BLD AUTO: 62.7 % — SIGNIFICANT CHANGE UP (ref 42.2–75.2)
NRBC BLD AUTO-RTO: 0 /100 WBCS — SIGNIFICANT CHANGE UP (ref 0–0)
PLATELET # BLD AUTO: 286 K/UL — SIGNIFICANT CHANGE UP (ref 130–400)
PMV BLD: 9.4 FL — SIGNIFICANT CHANGE UP (ref 7.4–10.4)
POTASSIUM SERPL-MCNC: 4.8 MMOL/L — SIGNIFICANT CHANGE UP (ref 3.5–5)
POTASSIUM SERPL-SCNC: 4.8 MMOL/L — SIGNIFICANT CHANGE UP (ref 3.5–5)
PROT SERPL-MCNC: 6.6 G/DL — SIGNIFICANT CHANGE UP (ref 6–8)
RBC # BLD: 4.78 M/UL — SIGNIFICANT CHANGE UP (ref 4.7–6.1)
RBC # FLD: 12.7 % — SIGNIFICANT CHANGE UP (ref 11.5–14.5)
SODIUM SERPL-SCNC: 140 MMOL/L — SIGNIFICANT CHANGE UP (ref 135–146)
WBC # BLD: 12.17 K/UL — HIGH (ref 4.8–10.8)
WBC # FLD AUTO: 12.17 K/UL — HIGH (ref 4.8–10.8)

## 2025-04-28 PROCEDURE — 99222 1ST HOSP IP/OBS MODERATE 55: CPT

## 2025-04-28 PROCEDURE — 84478 ASSAY OF TRIGLYCERIDES: CPT

## 2025-04-28 PROCEDURE — 99221 1ST HOSP IP/OBS SF/LOW 40: CPT

## 2025-04-28 PROCEDURE — 80354 DRUG SCREENING FENTANYL: CPT

## 2025-04-28 PROCEDURE — 83690 ASSAY OF LIPASE: CPT

## 2025-04-28 PROCEDURE — 83735 ASSAY OF MAGNESIUM: CPT

## 2025-04-28 PROCEDURE — 74177 CT ABD & PELVIS W/CONTRAST: CPT | Mod: 26

## 2025-04-28 PROCEDURE — 80361 OPIATES 1 OR MORE: CPT

## 2025-04-28 PROCEDURE — 85025 COMPLETE CBC W/AUTO DIFF WBC: CPT

## 2025-04-28 PROCEDURE — 99285 EMERGENCY DEPT VISIT HI MDM: CPT

## 2025-04-28 PROCEDURE — 80346 BENZODIAZEPINES1-12: CPT

## 2025-04-28 PROCEDURE — 80053 COMPREHEN METABOLIC PANEL: CPT

## 2025-04-28 PROCEDURE — 93010 ELECTROCARDIOGRAM REPORT: CPT

## 2025-04-28 PROCEDURE — 36415 COLL VENOUS BLD VENIPUNCTURE: CPT

## 2025-04-28 PROCEDURE — 99223 1ST HOSP IP/OBS HIGH 75: CPT

## 2025-04-28 PROCEDURE — 99232 SBSQ HOSP IP/OBS MODERATE 35: CPT

## 2025-04-28 PROCEDURE — 80307 DRUG TEST PRSMV CHEM ANLYZR: CPT

## 2025-04-28 RX ORDER — ONDANSETRON HCL/PF 4 MG/2 ML
4 VIAL (ML) INJECTION EVERY 6 HOURS
Refills: 0 | Status: DISCONTINUED | OUTPATIENT
Start: 2025-04-28 | End: 2025-05-01

## 2025-04-28 RX ORDER — MELATONIN 5 MG
3 TABLET ORAL AT BEDTIME
Refills: 0 | Status: DISCONTINUED | OUTPATIENT
Start: 2025-04-28 | End: 2025-05-01

## 2025-04-28 RX ORDER — ONDANSETRON HCL/PF 4 MG/2 ML
4 VIAL (ML) INJECTION ONCE
Refills: 0 | Status: COMPLETED | OUTPATIENT
Start: 2025-04-28 | End: 2025-04-28

## 2025-04-28 RX ORDER — HYDROXYZINE HYDROCHLORIDE 25 MG/1
50 TABLET, FILM COATED ORAL EVERY 6 HOURS
Refills: 0 | Status: DISCONTINUED | OUTPATIENT
Start: 2025-04-28 | End: 2025-04-30

## 2025-04-28 RX ORDER — POLYETHYLENE GLYCOL 3350 17 G/17G
17 POWDER, FOR SOLUTION ORAL
Refills: 0 | Status: DISCONTINUED | OUTPATIENT
Start: 2025-04-28 | End: 2025-05-01

## 2025-04-28 RX ORDER — LORAZEPAM 4 MG/ML
2 VIAL (ML) INJECTION
Refills: 0 | Status: DISCONTINUED | OUTPATIENT
Start: 2025-04-28 | End: 2025-04-29

## 2025-04-28 RX ORDER — FOLIC ACID 1 MG/1
1 TABLET ORAL DAILY
Refills: 0 | Status: DISCONTINUED | OUTPATIENT
Start: 2025-04-28 | End: 2025-05-01

## 2025-04-28 RX ORDER — SODIUM CHLORIDE 9 G/1000ML
1000 INJECTION, SOLUTION INTRAVENOUS
Refills: 0 | Status: DISCONTINUED | OUTPATIENT
Start: 2025-04-28 | End: 2025-05-01

## 2025-04-28 RX ORDER — ACETAMINOPHEN 500 MG/5ML
650 LIQUID (ML) ORAL EVERY 6 HOURS
Refills: 0 | Status: DISCONTINUED | OUTPATIENT
Start: 2025-04-28 | End: 2025-04-29

## 2025-04-28 RX ORDER — HYDROMORPHONE/SOD CHLOR,ISO/PF 2 MG/10 ML
1 SYRINGE (ML) INJECTION EVERY 6 HOURS
Refills: 0 | Status: DISCONTINUED | OUTPATIENT
Start: 2025-04-28 | End: 2025-04-28

## 2025-04-28 RX ORDER — HYDROMORPHONE/SOD CHLOR,ISO/PF 2 MG/10 ML
1 SYRINGE (ML) INJECTION EVERY 4 HOURS
Refills: 0 | Status: DISCONTINUED | OUTPATIENT
Start: 2025-04-28 | End: 2025-04-30

## 2025-04-28 RX ORDER — SIMETHICONE 80 MG
80 TABLET,CHEWABLE ORAL
Refills: 0 | Status: DISCONTINUED | OUTPATIENT
Start: 2025-04-28 | End: 2025-05-01

## 2025-04-28 RX ORDER — INFLUENZA A VIRUS A/IDAHO/07/2018 (H1N1) ANTIGEN (MDCK CELL DERIVED, PROPIOLACTONE INACTIVATED, INFLUENZA A VIRUS A/INDIANA/08/2018 (H3N2) ANTIGEN (MDCK CELL DERIVED, PROPIOLACTONE INACTIVATED), INFLUENZA B VIRUS B/SINGAPORE/INFTT-16-0610/2016 ANTIGEN (MDCK CELL DERIVED, PROPIOLACTONE INACTIVATED), INFLUENZA B VIRUS B/IOWA/06/2017 ANTIGEN (MDCK CELL DERIVED, PROPIOLACTONE INACTIVATED) 15; 15; 15; 15 UG/.5ML; UG/.5ML; UG/.5ML; UG/.5ML
0.5 INJECTION, SUSPENSION INTRAMUSCULAR ONCE
Refills: 0 | Status: DISCONTINUED | OUTPATIENT
Start: 2025-04-28 | End: 2025-05-01

## 2025-04-28 RX ADMIN — Medication 1 MILLIGRAM(S): at 20:10

## 2025-04-28 RX ADMIN — Medication 1 MILLIGRAM(S): at 18:40

## 2025-04-28 RX ADMIN — Medication 1 MILLIGRAM(S): at 10:20

## 2025-04-28 RX ADMIN — Medication 4 MILLIGRAM(S): at 07:50

## 2025-04-28 RX ADMIN — Medication 2 MILLIGRAM(S): at 13:20

## 2025-04-28 RX ADMIN — Medication 4 MILLIGRAM(S): at 10:09

## 2025-04-28 RX ADMIN — Medication 650 MILLIGRAM(S): at 15:34

## 2025-04-28 RX ADMIN — Medication 100 MILLIGRAM(S): at 13:20

## 2025-04-28 RX ADMIN — SODIUM CHLORIDE 200 MILLILITER(S): 9 INJECTION, SOLUTION INTRAVENOUS at 09:02

## 2025-04-28 RX ADMIN — Medication 2 MILLIGRAM(S): at 22:29

## 2025-04-28 RX ADMIN — Medication 4 MILLIGRAM(S): at 05:01

## 2025-04-28 RX ADMIN — Medication 2 MILLIGRAM(S): at 15:45

## 2025-04-28 RX ADMIN — SODIUM CHLORIDE 200 MILLILITER(S): 9 INJECTION, SOLUTION INTRAVENOUS at 13:22

## 2025-04-28 RX ADMIN — Medication 2 MILLIGRAM(S): at 20:10

## 2025-04-28 RX ADMIN — Medication 650 MILLIGRAM(S): at 13:20

## 2025-04-28 RX ADMIN — Medication 8 MILLIGRAM(S): at 07:50

## 2025-04-28 RX ADMIN — Medication 1 MILLIGRAM(S): at 21:00

## 2025-04-28 RX ADMIN — Medication 1 MILLIGRAM(S): at 17:12

## 2025-04-28 RX ADMIN — SODIUM CHLORIDE 200 MILLILITER(S): 9 INJECTION, SOLUTION INTRAVENOUS at 07:49

## 2025-04-28 RX ADMIN — Medication 4 MILLIGRAM(S): at 09:02

## 2025-04-28 RX ADMIN — Medication 650 MILLIGRAM(S): at 18:45

## 2025-04-28 RX ADMIN — Medication 8 MILLIGRAM(S): at 06:16

## 2025-04-28 RX ADMIN — Medication 1000 MILLILITER(S): at 05:00

## 2025-04-28 RX ADMIN — FOLIC ACID 1 MILLIGRAM(S): 1 TABLET ORAL at 13:19

## 2025-04-28 RX ADMIN — Medication 1 MILLIGRAM(S): at 16:59

## 2025-04-28 RX ADMIN — SODIUM CHLORIDE 200 MILLILITER(S): 9 INJECTION, SOLUTION INTRAVENOUS at 20:13

## 2025-04-28 NOTE — CONSULT NOTE ADULT - TIME BILLING
Reviewed all pertinent clinical information and reviewed all relevant imaging and diagnostic studies. Counseled the patient  about diagnostic testing and treatment plan. All questions were answered.  Discussed recommendations with the primary team and coordinated care.

## 2025-04-28 NOTE — ED PROVIDER NOTE - OBJECTIVE STATEMENT
39-year-old male PMH alcohol abuse and pancreatitis presenting to ED for evaluation of epigastric pain radiating to upper back.  Pain is similar to previous episodes of pancreatitis.  Denies any associated fever, chills, nausea, vomiting, diarrhea, chest pain or shortness of breath.

## 2025-04-28 NOTE — CONSULT NOTE ADULT - SUBJECTIVE AND OBJECTIVE BOX
Patient is 40 y/o male with a PMHx of alcohol abuse and pancreatitis who presented to ED for abdominal pain. Patient notes pain started prior to presentation. Patient notes pain was 10/10, located in B/L upper abdomen and without alleviating factors at home.   Pain is similar to previous episodes of pancreatitis from a year ago.  States last drink was prior to hospital presentation consisting of 3 beers and a bottle of wine. Feels somewhat better since admission. He notes some nausea.      PAST MEDICAL & SURGICAL HISTORY:  ETOH abuse  Pancreatitis      MEDICATIONS  (STANDING):  acetaminophen     Tablet .. 650 milliGRAM(s) Oral every 6 hours  chlorhexidine 2% Cloths 1 Application(s) Topical <User Schedule>  folic acid 1 milliGRAM(s) Oral daily  influenza   Vaccine 0.5 milliLiter(s) IntraMuscular once  lactated ringers. 1000 milliLiter(s) (200 mL/Hr) IV Continuous <Continuous>  thiamine 100 milliGRAM(s) Oral daily    MEDICATIONS  (PRN):  HYDROmorphone  Injectable 1 milliGRAM(s) IV Push every 6 hours PRN Severe Pain (7 - 10)  hydrOXYzine hydrochloride 50 milliGRAM(s) Oral every 6 hours PRN Anxiety  LORazepam     Tablet 2 milliGRAM(s) Oral every 2 hours PRN CIWA-Ar score  8 - 15  melatonin 3 milliGRAM(s) Oral at bedtime PRN Sleep  ondansetron Injectable 4 milliGRAM(s) IV Push every 6 hours PRN Nausea and/or Vomiting  polyethylene glycol 3350 17 Gram(s) Oral two times a day PRN Constipation  simethicone 80 milliGRAM(s) Chew two times a day PRN Gas      Allergies  No Known Allergies      Review of Systems  General:  Denies Fatigue, Denies Fever, Denies Weakness ,Denies Weight Loss   HEENT: Denies Trouble Swallowing ,Denies  Sore Throat , Denies Change in hearing/vision/speech ,Denies Dizziness    Cardio: Denies  Chest Pain , Palpitations    Respiratory: Denies worsening of SOB, Denies Cough  Abdomen: See detailed HPI  Neuro: Denies Headache Denies Dizziness, Denies Paresthesias  MSK: Denies pain in Bones/Joints/Muscles   Psych: Patient denies depression, denies suicidal or homicidal ideations  Integ: Patient Denies rash, or new skin lesions     Vital Signs Last 24 Hrs  T(C): 36.7 (28 Apr 2025 14:08), Max: 36.7 (28 Apr 2025 14:08)  T(F): 98 (28 Apr 2025 14:08), Max: 98 (28 Apr 2025 14:08)  HR: 81 (28 Apr 2025 14:08) (70 - 87)  BP: 134/82 (28 Apr 2025 14:08) (129/75 - 158/86)  BP(mean): --  RR: 16 (28 Apr 2025 14:08) (16 - 18)  SpO2: 97% (28 Apr 2025 14:08) (96% - 99%)    Parameters below as of 28 Apr 2025 07:50  Patient On (Oxygen Delivery Method): room air      PHYSICAL EXAM:  GENERAL: NAD, well-appearing  CHEST/LUNG: Clear to auscultation bilaterally  HEART: Regular rate and rhythm  ABDOMEN: TTP B/L Upper abdomen, No guarding   EXTREMITIES:  No clubbing, cyanosis, or edema        Labs:                          15.6   12.17 )-----------( 286      ( 28 Apr 2025 05:21 )             46.5       Auto Immature Granulocyte %: 1.6 % (04-28-25 @ 05:21)    04-28    140  |  96[L]  |  22[H]  ----------------------------<  86  4.8   |  32  |  0.8      Calcium: 9.4 mg/dL (04-28-25 @ 05:21)      LFTs:             6.6  | <0.2 | 49       ------------------[83      ( 28 Apr 2025 05:21 )  4.6  | x    | 51          Lipase:399      Lactate, Blood: 1.0 mmol/L (04-28-25 @ 05:21)    Alcohol, Blood: <10 mg/dL (04-28-25 @ 06:40)      RADIOLOGY & ADDITIONAL STUDIES:  CT Abdomen and Pelvis w/ IV Cont 04.28.25  IMPRESSION:  1.  Trace inflammatory change adjacent to the pancreatic tail, which can   be seen with acute pancreatitis. Recommend correlation with serum lipase   levels.       Patient is 40 y/o male with a PMH of alcohol abuse and pancreatitis who presented to ED for abdominal pain. Patient notes pain started prior to presentation. Patient notes pain was 10/10, located in B/L upper abdomen and without alleviating factors at home.   Pain is similar to previous episodes of pancreatitis from a year ago.  States last drink was prior to hospital presentation consisting of 3 beers and a bottle of wine. Feels somewhat better since admission. He notes some nausea.      PAST MEDICAL & SURGICAL HISTORY:  ETOH abuse  Pancreatitis      MEDICATIONS  (STANDING):  acetaminophen     Tablet .. 650 milliGRAM(s) Oral every 6 hours  chlorhexidine 2% Cloths 1 Application(s) Topical <User Schedule>  folic acid 1 milliGRAM(s) Oral daily  influenza   Vaccine 0.5 milliLiter(s) IntraMuscular once  lactated ringers. 1000 milliLiter(s) (200 mL/Hr) IV Continuous <Continuous>  thiamine 100 milliGRAM(s) Oral daily    MEDICATIONS  (PRN):  HYDROmorphone  Injectable 1 milliGRAM(s) IV Push every 6 hours PRN Severe Pain (7 - 10)  hydrOXYzine hydrochloride 50 milliGRAM(s) Oral every 6 hours PRN Anxiety  LORazepam     Tablet 2 milliGRAM(s) Oral every 2 hours PRN CIWA-Ar score  8 - 15  melatonin 3 milliGRAM(s) Oral at bedtime PRN Sleep  ondansetron Injectable 4 milliGRAM(s) IV Push every 6 hours PRN Nausea and/or Vomiting  polyethylene glycol 3350 17 Gram(s) Oral two times a day PRN Constipation  simethicone 80 milliGRAM(s) Chew two times a day PRN Gas      Allergies  No Known Allergies      Review of Systems  General:  Denies Fatigue, Denies Fever, Denies Weakness ,Denies Weight Loss   HEENT: Denies Trouble Swallowing ,Denies  Sore Throat , Denies Change in hearing/vision/speech ,Denies Dizziness    Cardio: Denies  Chest Pain , Palpitations    Respiratory: Denies worsening of SOB, Denies Cough  Abdomen: See detailed HPI  Neuro: Denies Headache Denies Dizziness, Denies Paresthesias  MSK: Denies pain in Bones/Joints/Muscles   Psych: Patient denies depression, denies suicidal or homicidal ideations  Integ: Patient Denies rash, or new skin lesions     Vital Signs Last 24 Hrs  T(C): 36.7 (28 Apr 2025 14:08), Max: 36.7 (28 Apr 2025 14:08)  T(F): 98 (28 Apr 2025 14:08), Max: 98 (28 Apr 2025 14:08)  HR: 81 (28 Apr 2025 14:08) (70 - 87)  BP: 134/82 (28 Apr 2025 14:08) (129/75 - 158/86)  BP(mean): --  RR: 16 (28 Apr 2025 14:08) (16 - 18)  SpO2: 97% (28 Apr 2025 14:08) (96% - 99%)    Parameters below as of 28 Apr 2025 07:50  Patient On (Oxygen Delivery Method): room air      PHYSICAL EXAM:  GENERAL: NAD, well-appearing  CHEST/LUNG: Clear to auscultation bilaterally  HEART: Regular rate and rhythm  ABDOMEN: TTP B/L Upper abdomen, No guarding   EXTREMITIES:  No clubbing, cyanosis, or edema        Labs:                          15.6   12.17 )-----------( 286      ( 28 Apr 2025 05:21 )             46.5       Auto Immature Granulocyte %: 1.6 % (04-28-25 @ 05:21)    04-28    140  |  96[L]  |  22[H]  ----------------------------<  86  4.8   |  32  |  0.8      Calcium: 9.4 mg/dL (04-28-25 @ 05:21)      LFTs:             6.6  | <0.2 | 49       ------------------[83      ( 28 Apr 2025 05:21 )  4.6  | x    | 51          Lipase:399      Lactate, Blood: 1.0 mmol/L (04-28-25 @ 05:21)    Alcohol, Blood: <10 mg/dL (04-28-25 @ 06:40)      RADIOLOGY & ADDITIONAL STUDIES:  CT Abdomen and Pelvis w/ IV Cont 04.28.25  IMPRESSION:  1.  Trace inflammatory change adjacent to the pancreatic tail, which can   be seen with acute pancreatitis. Recommend correlation with serum lipase   levels.

## 2025-04-28 NOTE — H&P ADULT - NSVTERISKREFERASSESS_GEN_ALL_CORE
Refer to the Assessment tab to view/cancel completed assessment. Intermediate Repair Preamble Text (Leave Blank If You Do Not Want): Wide undermining was performed with blunt dissection.

## 2025-04-28 NOTE — H&P ADULT - HISTORY OF PRESENT ILLNESS
39-year-old male PMH alcohol abuse and pancreatitis presents for evaluation of epigastric pain radiating to upper back.  39-year-old male PMHx alcohol abuse and pancreatitis presented to ED for evaluation of 10/10 epigastric pain radiating to upper back since yesterday at 11p.  Pain is similar to previous episodes of pancreatitis from a year ago. Denies n/v at this time but is concerned it will happen with his knowledge  and Hx of having it. States last drink was yesterday with his usual 3 beers and a bottle of wine. Last drink was about a year ago. Denies being in withdrawal at this time, but is concerned he will be. Abd pain improved now 5/10. Denies any associated fever, chills, nausea, vomiting, diarrhea, chest pain or shortness of breath.

## 2025-04-28 NOTE — PATIENT PROFILE ADULT - FALL HARM RISK - TYPE OF ASSESSMENT
arimidex      Last Written Prescription Date:  10/22/21`  Last Fill Quantity: 90,   # refills: 3  Last Office Visit: 8/12/22  Future Office visit:       
Admission

## 2025-04-28 NOTE — ED PROVIDER NOTE - CLINICAL SUMMARY MEDICAL DECISION MAKING FREE TEXT BOX
39-year-old male, history of pancreatitis, alcohol use disorder, presents to the ED with epigastric pain since last night.  No vomiting or diarrhea.  Tender epigastrium.  Labs noted for WBC 12.1 hemoglobin 15, BUN 22, creatinine 0.8, lactate 1.0, lipase 399.  CT abdomen consistent with pancreatitis.  Given IV fluids, morphine x 2 with minimal relief.  Will admit.

## 2025-04-28 NOTE — H&P ADULT - ASSESSMENT
39-year-old male PMH alcohol abuse and pancreatitis presents for epigastric pain radiating to upper back    #etoh pancreatitis   - IVF 200cc/hr  - gi eval   - addiction eval   - pain control prn   - zofran prn nausea   - CIWA prn ativan    39-year-old male PMH alcohol abuse and pancreatitis presents for epigastric pain radiating to upper back    # ETOH pancreatitis   - IVF 200cc/hr  - GI eval   - addiction eval   - pain control prn   - thiamine  - Folate  - utox  - zofran prn nausea   - CIWA prn ativan   - trend lipase  - repeat labs  - supportive care  - will c/t monitor

## 2025-04-28 NOTE — ED PROVIDER NOTE - ATTENDING APP SHARED VISIT CONTRIBUTION OF CARE
39-year-old male, history of pancreatitis, alcohol use disorder, presents with epigastric pain since last night.  No vomiting or diarrhea.  Exam shows alert patient in no distress, HEENT NCAT PERRL, lungs clear, RR S1S2, abdomen soft + epigastric tenderness +BS, no CCE.

## 2025-04-28 NOTE — ED ADULT NURSE NOTE - NSFALLUNIVINTERV_ED_ALL_ED
Bed/Stretcher in lowest position, wheels locked, appropriate side rails in place/Call bell, personal items and telephone in reach/Instruct patient to call for assistance before getting out of bed/chair/stretcher/Non-slip footwear applied when patient is off stretcher/[address] to call system/Physically safe environment - no spills, clutter or unnecessary equipment/Purposeful proactive rounding/Room/bathroom lighting operational, light cord in reach

## 2025-04-28 NOTE — H&P ADULT - NSHPPHYSICALEXAM_GEN_ALL_CORE
VITALS:   T(C): 36.3 (04-28-25 @ 04:37), Max: 36.3 (04-28-25 @ 04:37)  HR: 87 (04-28-25 @ 04:37) (87 - 87)  BP: 158/86 (04-28-25 @ 04:37) (158/86 - 158/86)  RR: 18 (04-28-25 @ 04:37) (18 - 18)  SpO2: 99% (04-28-25 @ 04:37) (99% - 99%)    GENERAL: NAD, lying in bed comfortably and pleasant  HEAD:  Atraumatic, Normocephalic  EYES: EOMI, conjunctiva and sclera clear  ENT: Moist mucous membranes  NECK: Supple, No JVD  CHEST/LUNG: CTA b/l;  Unlabored respirations  HEART: Regular rate and rhythm; No murmurs  ABDOMEN: BS present; Soft, + epigastric ttp  EXTREMITIES:  No clubbing, cyanosis, or edema  NERVOUS SYSTEM:  Alert & Oriented X3, speech clear. No deficits   MSK: FROM all 4 extremities, full and equal strength  SKIN: No rashes or lesions

## 2025-04-28 NOTE — ED PROVIDER NOTE - PHYSICAL EXAMINATION
GENERAL: Well-nourished, Well-developed. NAD.  HEAD: No visible or palpable bumps or hematomas. No ecchymosis behind ears B/L.  Eyes: PERRLA, EOMI.   ENMT: MMM.   CVS: Normal S1,S2. No murmurs appreciated on auscultation   RESP: No use of accessory muscles. Chest rise symmetrical with good expansion. Lungs clear to auscultation B/L. No wheezing, rales, or rhonchi auscultated.  GI: Normal auscultation of bowel sounds in all 4 quadrants. (+)ttp epigastric region. Soft, Nondistended. No guarding or rebound tenderness. No CVAT B/L.  Skin: Warm, Dry. No rashes or lesions. Good cap refill < 2 sec B/L.

## 2025-04-28 NOTE — H&P ADULT - NSHPLABSRESULTS_GEN_ALL_CORE
15.6   12.17 )-----------( 286      ( 28 Apr 2025 05:21 )             46.5       04-28    140  |  96[L]  |  22[H]  ----------------------------<  86  4.8   |  32  |  0.8    Ca    9.4      28 Apr 2025 05:21    TPro  6.6  /  Alb  4.6  /  TBili  <0.2  /  DBili  x   /  AST  49[H]  /  ALT  51[H]  /  AlkPhos  83  04-28        Lactate Trend  04-28 @ 05:21 Lactate:1.0       < from: CT Abdomen and Pelvis w/ IV Cont (04.28.25 @ 05:34) >    1.  Trace inflammatory change adjacent to the pancreatic tail, which can   be seen with acute pancreatitis. Recommend correlation with serum lipase   levels.    < end of copied text > 15.6   12.17 )-----------( 286      ( 28 Apr 2025 05:21 )             46.5       04-28    140  |  96[L]  |  22[H]  ----------------------------<  86  4.8   |  32  |  0.8    Ca    9.4      28 Apr 2025 05:21    TPro  6.6  /  Alb  4.6  /  TBili  <0.2  /  DBili  x   /  AST  49[H]  /  ALT  51[H]  /  AlkPhos  83  04-28        Lactate Trend  04-28 @ 05:21 Lactate:1.0       CT Abdomen and Pelvis w/ IV Cont (04.28.25 @ 05:34)     1.  Trace inflammatory change adjacent to the pancreatic tail, which can   be seen with acute pancreatitis. Recommend correlation with serum lipase   levels.

## 2025-04-28 NOTE — PATIENT PROFILE ADULT - FALL HARM RISK - HARM RISK INTERVENTIONS
Assistance with ambulation/Assistance OOB with selected safe patient handling equipment/Communicate Risk of Fall with Harm to all staff/Monitor for mental status changes/Monitor gait and stability/Reinforce activity limits and safety measures with patient and family/Tailored Fall Risk Interventions/Toileting schedule using arm’s reach rule for commode and bathroom/Use of alarms - bed, chair and/or voice tab/Visual Cue: Yellow wristband and red socks/Bed in lowest position, wheels locked, appropriate side rails in place/Call bell, personal items and telephone in reach/Instruct patient to call for assistance before getting out of bed or chair/Non-slip footwear when patient is out of bed/[place] to call system/Physically safe environment - no spills, clutter or unnecessary equipment/Purposeful Proactive Rounding/Room/bathroom lighting operational, light cord in reach

## 2025-04-28 NOTE — CONSULT NOTE ADULT - ASSESSMENT
Patient is 40 y/o male with a PMHx of alcohol abuse and pancreatitis who presented to ED for abdominal pain. Patient notes pain started prior to presentation. Patient notes pain was 10/10, located in B/L upper abdomen and without alleviating factors at home.   Pain is similar to previous episodes of pancreatitis from a year ago.  States last drink was prior to hospital presentation consisting of 3 beers and a bottle of wine. Feels somewhat better since admission. He notes some nausea.      Pancreatitis  - Lipase was 299 on admission  - Gallbladder intact on CT and does not appear to be inflamed   - Obtain TG levels  - Likely from eTOH use  - Agree with LR as ordered  - Pain regimen as ordered  - Advance diet as tolerated to low fat- likely not ready to advance today  - LFT reassuring  - ETOH cessation advised  - Will follow

## 2025-04-28 NOTE — CONSULT NOTE ADULT - SUBJECTIVE AND OBJECTIVE BOX
Pt interviewed, examined and EMR chart reviewed.  Pt admits to drinking--- x    months. Last Drink   Hx of withdrawal   variable periods of sobriety in the past.  Has been in detox before _____yes,   _____No    SOCIAL HISTORY:    REVIEW OF SYSTEMS:    Constitutional: No fever, weight loss or fatigue  ENT:  No difficulty hearing, tinnitus, vertigo; No sinus or throat pain  Neck: No pain or stiffness  Respiratory: No cough, wheezing, chills or hemoptysis  Cardiovascular: No chest pain, palpitations, shortness of breath, dizziness or leg swelling  Gastrointestinal: No abdominal or epigastric pain. No nausea, vomiting or hematemesis; No diarrhea or constipation. No melena or hematochezia.  Neurological: No headaches, memory loss, loss of strength, numbness or tremors  Musculoskeletal: No joint pain or swelling; No muscle, back or extremity pain  Psychiatric: No depression, anxiety, mood swings or difficulty sleeping    MEDICATIONS  (STANDING):  folic acid 1 milliGRAM(s) Oral daily  lactated ringers. 1000 milliLiter(s) (200 mL/Hr) IV Continuous <Continuous>  thiamine 100 milliGRAM(s) Oral daily    MEDICATIONS  (PRN):  hydrOXYzine hydrochloride 50 milliGRAM(s) Oral every 6 hours PRN Anxiety  LORazepam     Tablet 2 milliGRAM(s) Oral every 2 hours PRN CIWA-Ar score  8 - 15  morphine  - Injectable 2 milliGRAM(s) IV Push every 4 hours PRN Moderate Pain (4 - 6)  morphine  - Injectable 4 milliGRAM(s) IV Push every 4 hours PRN Severe Pain (7 - 10)      Vital Signs Last 24 Hrs  T(C): 36.3 (28 Apr 2025 04:37), Max: 36.3 (28 Apr 2025 04:37)  T(F): 97.4 (28 Apr 2025 04:37), Max: 97.4 (28 Apr 2025 04:37)  HR: 87 (28 Apr 2025 04:37) (87 - 87)  BP: 158/86 (28 Apr 2025 04:37) (158/86 - 158/86)  BP(mean): --  RR: 18 (28 Apr 2025 04:37) (18 - 18)  SpO2: 99% (28 Apr 2025 04:37) (99% - 99%)    Parameters below as of 28 Apr 2025 04:37  Patient On (Oxygen Delivery Method): room air        PHYSICAL EXAM:    Constitutional: NAD, well-groomed, well-developed  HEENT: PERRLA, EOMI, Normal Hearing,  Neck: No LAD, No JVD  Back: Normal spine flexure, No CVA tenderness  Respiratory: CTAB/L  Cardiovascular: S1 and S2, RRR, no M/G/R  Gastrointestinal: BS+, soft, NT/ND  Extremities: No peripheral edema  Neurological: A/O x 3, no focal deficits    LABS:                        15.6   12.17 )-----------( 286      ( 28 Apr 2025 05:21 )             46.5     04-28    140  |  96[L]  |  22[H]  ----------------------------<  86  4.8   |  32  |  0.8    Ca    9.4      28 Apr 2025 05:21    TPro  6.6  /  Alb  4.6  /  TBili  <0.2  /  DBili  x   /  AST  49[H]  /  ALT  51[H]  /  AlkPhos  83  04-28      Urinalysis Basic - ( 28 Apr 2025 05:21 )    Color: x / Appearance: x / SG: x / pH: x  Gluc: 86 mg/dL / Ketone: x  / Bili: x / Urobili: x   Blood: x / Protein: x / Nitrite: x   Leuk Esterase: x / RBC: x / WBC x   Sq Epi: x / Non Sq Epi: x / Bacteria: x      Drug Screen Urine:  Alcohol Level  Alcohol, Blood: <10 mg/dL (04-28-25 @ 06:40)        RADIOLOGY & ADDITIONAL STUDIES:   39-year-old male PMHx alcohol abuse and pancreatitis presented to ED for evaluation of 10/10 epigastric pain radiating to upper back since yesterday at 11p.  Pain is similar to previous episodes of pancreatitis from a year ago. Denies n/v at this time but is concerned it will happen with his knowledge  and Hx of having it. States last drink was yesterday with his usual 3 beers and a bottle of wine. Last drink was about a year ago. Denies being in withdrawal at this time, but is concerned he will be. Abd pain improved now 5/10. Denies any associated fever, chills, nausea, vomiting, diarrhea, chest pain or shortness of breath.        Pt interviewed, examined and EMR chart reviewed.  Pt admits to drinking 1.75 liter of wine x  greater than 1 year. Last Drink day prior to admnission   Hx of withdrawal mild withdrawal tremors  variable periods of sobriety in the past.  Has been in detox before ___X__yes,   _____No        REVIEW OF SYSTEMS:    Constitutional: No fever, weight loss or fatigue  ENT:  No difficulty hearing, tinnitus, vertigo; No sinus or throat pain  Neck: No pain or stiffness  Respiratory: No cough, wheezing, chills or hemoptysis  Cardiovascular: No chest pain, palpitations, shortness of breath, dizziness or leg swelling  Gastrointestinal: SEE HPI  Neurological: No headaches, memory loss, loss of strength, numbness or tremors  Musculoskeletal: No joint pain or swelling; No muscle, back or extremity pain  Psychiatric: No depression, anxiety, mood swings or difficulty sleeping    MEDICATIONS  (STANDING):  folic acid 1 milliGRAM(s) Oral daily  lactated ringers. 1000 milliLiter(s) (200 mL/Hr) IV Continuous <Continuous>  thiamine 100 milliGRAM(s) Oral daily    MEDICATIONS  (PRN):  hydrOXYzine hydrochloride 50 milliGRAM(s) Oral every 6 hours PRN Anxiety  LORazepam     Tablet 2 milliGRAM(s) Oral every 2 hours PRN CIWA-Ar score  8 - 15  morphine  - Injectable 2 milliGRAM(s) IV Push every 4 hours PRN Moderate Pain (4 - 6)  morphine  - Injectable 4 milliGRAM(s) IV Push every 4 hours PRN Severe Pain (7 - 10)      Vital Signs Last 24 Hrs  T(C): 36.3 (28 Apr 2025 04:37), Max: 36.3 (28 Apr 2025 04:37)  T(F): 97.4 (28 Apr 2025 04:37), Max: 97.4 (28 Apr 2025 04:37)  HR: 87 (28 Apr 2025 04:37) (87 - 87)  BP: 158/86 (28 Apr 2025 04:37) (158/86 - 158/86)  BP(mean): --  RR: 18 (28 Apr 2025 04:37) (18 - 18)  SpO2: 99% (28 Apr 2025 04:37) (99% - 99%)    Parameters below as of 28 Apr 2025 04:37  Patient On (Oxygen Delivery Method): room air        PHYSICAL EXAM:    Constitutional: NAD, well-groomed, well-developed  HEENT: PERRLA, EOMI, Normal Hearing,  Neck: No LAD, No JVD  Back: Normal spine flexure, No CVA tenderness  Respiratory: CTAB/L  Cardiovascular: S1 and S2, RRR, no M/G/R  Gastrointestinal: BS+, soft, NT/ND  Extremities: No peripheral edema  Neurological: A/O x 3, no focal deficits    LABS:                        15.6   12.17 )-----------( 286      ( 28 Apr 2025 05:21 )             46.5     04-28    140  |  96[L]  |  22[H]  ----------------------------<  86  4.8   |  32  |  0.8    Ca    9.4      28 Apr 2025 05:21    TPro  6.6  /  Alb  4.6  /  TBili  <0.2  /  DBili  x   /  AST  49[H]  /  ALT  51[H]  /  AlkPhos  83  04-28      Urinalysis Basic - ( 28 Apr 2025 05:21 )    Color: x / Appearance: x / SG: x / pH: x  Gluc: 86 mg/dL / Ketone: x  / Bili: x / Urobili: x   Blood: x / Protein: x / Nitrite: x   Leuk Esterase: x / RBC: x / WBC x   Sq Epi: x / Non Sq Epi: x / Bacteria: x      Drug Screen Urine:  Alcohol Level  Alcohol, Blood: <10 mg/dL (04-28-25 @ 06:40)        RADIOLOGY & ADDITIONAL STUDIES:

## 2025-04-28 NOTE — CONSULT NOTE ADULT - PROBLEM SELECTOR RECOMMENDATION 9
After evaluation at this time would start on CIWA and if gets many doses would progress to standing protocol. Pt denies any significant withdrawal history.  Pt should be on Thiamine and Folic acid. Pt will be monitored and supportive care provided.  Obtain UDS if not done already.  Use of Vistaril for anxiety.  Consider adding gabapentin for anxiety standing order and follow up with PMD/Program for continuation of treatment.    Abdominal ultrasound AFP every 6 months for HCC screening  -EGD outpatient for esophageal varices screening   -Follow up with Private GI or our [facility] located at [address] [place] [address]. Phone Number: [phone]  -Alcohol counseling provided   CATCH team involved for aftercare and pt will follow up with aftercare

## 2025-04-28 NOTE — CONSULT NOTE ADULT - NS ATTEND AMEND GEN_ALL_CORE FT
Patient seen and examined during the GI rounds, case discussed with the GI PA plan communicated to the primary team, assessment, recommendations and plan as above.

## 2025-04-29 LAB
ALBUMIN SERPL ELPH-MCNC: 4.1 G/DL — SIGNIFICANT CHANGE UP (ref 3.5–5.2)
ALP SERPL-CCNC: 52 U/L — SIGNIFICANT CHANGE UP (ref 30–115)
ALT FLD-CCNC: 38 U/L — SIGNIFICANT CHANGE UP (ref 0–41)
ANION GAP SERPL CALC-SCNC: 9 MMOL/L — SIGNIFICANT CHANGE UP (ref 7–14)
AST SERPL-CCNC: 40 U/L — SIGNIFICANT CHANGE UP (ref 0–41)
BASOPHILS # BLD AUTO: 0.04 K/UL — SIGNIFICANT CHANGE UP (ref 0–0.2)
BASOPHILS NFR BLD AUTO: 0.5 % — SIGNIFICANT CHANGE UP (ref 0–1)
BILIRUB SERPL-MCNC: 0.3 MG/DL — SIGNIFICANT CHANGE UP (ref 0.2–1.2)
BUN SERPL-MCNC: 7 MG/DL — LOW (ref 10–20)
CALCIUM SERPL-MCNC: 9 MG/DL — SIGNIFICANT CHANGE UP (ref 8.4–10.5)
CHLORIDE SERPL-SCNC: 103 MMOL/L — SIGNIFICANT CHANGE UP (ref 98–110)
CO2 SERPL-SCNC: 27 MMOL/L — SIGNIFICANT CHANGE UP (ref 17–32)
CREAT SERPL-MCNC: 0.7 MG/DL — SIGNIFICANT CHANGE UP (ref 0.7–1.5)
EGFR: 121 ML/MIN/1.73M2 — SIGNIFICANT CHANGE UP
EGFR: 121 ML/MIN/1.73M2 — SIGNIFICANT CHANGE UP
EOSINOPHIL # BLD AUTO: 0.26 K/UL — SIGNIFICANT CHANGE UP (ref 0–0.7)
EOSINOPHIL NFR BLD AUTO: 3.3 % — SIGNIFICANT CHANGE UP (ref 0–8)
GLUCOSE SERPL-MCNC: 103 MG/DL — HIGH (ref 70–99)
HCT VFR BLD CALC: 42.4 % — SIGNIFICANT CHANGE UP (ref 42–52)
HGB BLD-MCNC: 14.5 G/DL — SIGNIFICANT CHANGE UP (ref 14–18)
IMM GRANULOCYTES NFR BLD AUTO: 0.8 % — HIGH (ref 0.1–0.3)
LIDOCAIN IGE QN: 726 U/L — HIGH (ref 7–60)
LYMPHOCYTES # BLD AUTO: 1.4 K/UL — SIGNIFICANT CHANGE UP (ref 1.2–3.4)
LYMPHOCYTES # BLD AUTO: 18 % — LOW (ref 20.5–51.1)
MAGNESIUM SERPL-MCNC: 2.1 MG/DL — SIGNIFICANT CHANGE UP (ref 1.8–2.4)
MCHC RBC-ENTMCNC: 33 PG — HIGH (ref 27–31)
MCHC RBC-ENTMCNC: 34.2 G/DL — SIGNIFICANT CHANGE UP (ref 32–37)
MCV RBC AUTO: 96.6 FL — HIGH (ref 80–94)
MONOCYTES # BLD AUTO: 1.31 K/UL — HIGH (ref 0.1–0.6)
MONOCYTES NFR BLD AUTO: 16.8 % — HIGH (ref 1.7–9.3)
NEUTROPHILS # BLD AUTO: 4.72 K/UL — SIGNIFICANT CHANGE UP (ref 1.4–6.5)
NEUTROPHILS NFR BLD AUTO: 60.6 % — SIGNIFICANT CHANGE UP (ref 42.2–75.2)
NRBC BLD AUTO-RTO: 0 /100 WBCS — SIGNIFICANT CHANGE UP (ref 0–0)
PCP SPEC-MCNC: SIGNIFICANT CHANGE UP
PLATELET # BLD AUTO: 223 K/UL — SIGNIFICANT CHANGE UP (ref 130–400)
PMV BLD: 9.1 FL — SIGNIFICANT CHANGE UP (ref 7.4–10.4)
POTASSIUM SERPL-MCNC: 4.1 MMOL/L — SIGNIFICANT CHANGE UP (ref 3.5–5)
POTASSIUM SERPL-SCNC: 4.1 MMOL/L — SIGNIFICANT CHANGE UP (ref 3.5–5)
PROT SERPL-MCNC: 5.6 G/DL — LOW (ref 6–8)
RBC # BLD: 4.39 M/UL — LOW (ref 4.7–6.1)
RBC # FLD: 12.6 % — SIGNIFICANT CHANGE UP (ref 11.5–14.5)
SODIUM SERPL-SCNC: 139 MMOL/L — SIGNIFICANT CHANGE UP (ref 135–146)
TRIGL SERPL-MCNC: 149 MG/DL — SIGNIFICANT CHANGE UP
WBC # BLD: 7.79 K/UL — SIGNIFICANT CHANGE UP (ref 4.8–10.8)
WBC # FLD AUTO: 7.79 K/UL — SIGNIFICANT CHANGE UP (ref 4.8–10.8)

## 2025-04-29 PROCEDURE — 99231 SBSQ HOSP IP/OBS SF/LOW 25: CPT

## 2025-04-29 PROCEDURE — 99233 SBSQ HOSP IP/OBS HIGH 50: CPT

## 2025-04-29 RX ORDER — OXYCODONE HYDROCHLORIDE 30 MG/1
5 TABLET ORAL THREE TIMES A DAY
Refills: 0 | Status: DISCONTINUED | OUTPATIENT
Start: 2025-04-29 | End: 2025-04-30

## 2025-04-29 RX ORDER — CHLORDIAZEPOXIDE HCL 10 MG
25 CAPSULE ORAL EVERY 4 HOURS
Refills: 0 | Status: DISCONTINUED | OUTPATIENT
Start: 2025-04-29 | End: 2025-05-01

## 2025-04-29 RX ORDER — CHLORDIAZEPOXIDE HCL 10 MG
20 CAPSULE ORAL EVERY 4 HOURS
Refills: 0 | Status: DISCONTINUED | OUTPATIENT
Start: 2025-04-29 | End: 2025-04-30

## 2025-04-29 RX ORDER — DIPHENHYDRAMINE HCL 12.5MG/5ML
25 ELIXIR ORAL EVERY 6 HOURS
Refills: 0 | Status: DISCONTINUED | OUTPATIENT
Start: 2025-04-29 | End: 2025-05-01

## 2025-04-29 RX ORDER — LORAZEPAM 4 MG/ML
2 VIAL (ML) INJECTION ONCE
Refills: 0 | Status: DISCONTINUED | OUTPATIENT
Start: 2025-04-29 | End: 2025-04-29

## 2025-04-29 RX ORDER — CHLORDIAZEPOXIDE HCL 10 MG
10 CAPSULE ORAL EVERY 4 HOURS
Refills: 0 | Status: DISCONTINUED | OUTPATIENT
Start: 2025-04-30 | End: 2025-05-01

## 2025-04-29 RX ORDER — CHLORDIAZEPOXIDE HCL 10 MG
CAPSULE ORAL
Refills: 0 | Status: COMPLETED | OUTPATIENT
Start: 2025-04-29 | End: 2025-05-01

## 2025-04-29 RX ORDER — CHLORDIAZEPOXIDE HCL 10 MG
15 CAPSULE ORAL EVERY 4 HOURS
Refills: 0 | Status: DISCONTINUED | OUTPATIENT
Start: 2025-04-30 | End: 2025-04-30

## 2025-04-29 RX ADMIN — FOLIC ACID 1 MILLIGRAM(S): 1 TABLET ORAL at 12:16

## 2025-04-29 RX ADMIN — Medication 650 MILLIGRAM(S): at 09:28

## 2025-04-29 RX ADMIN — Medication 1 MILLIGRAM(S): at 05:34

## 2025-04-29 RX ADMIN — Medication 2 MILLIGRAM(S): at 01:34

## 2025-04-29 RX ADMIN — Medication 20 MILLIGRAM(S): at 22:10

## 2025-04-29 RX ADMIN — Medication 25 MILLIGRAM(S): at 22:10

## 2025-04-29 RX ADMIN — Medication 1 MILLIGRAM(S): at 05:47

## 2025-04-29 RX ADMIN — OXYCODONE HYDROCHLORIDE 5 MILLIGRAM(S): 30 TABLET ORAL at 14:15

## 2025-04-29 RX ADMIN — Medication 80 MILLIGRAM(S): at 00:56

## 2025-04-29 RX ADMIN — Medication 20 MILLIGRAM(S): at 10:16

## 2025-04-29 RX ADMIN — Medication 25 MILLIGRAM(S): at 14:10

## 2025-04-29 RX ADMIN — Medication 100 MILLIGRAM(S): at 12:15

## 2025-04-29 RX ADMIN — Medication 20 MILLIGRAM(S): at 18:18

## 2025-04-29 RX ADMIN — OXYCODONE HYDROCHLORIDE 5 MILLIGRAM(S): 30 TABLET ORAL at 20:43

## 2025-04-29 RX ADMIN — SODIUM CHLORIDE 200 MILLILITER(S): 9 INJECTION, SOLUTION INTRAVENOUS at 18:17

## 2025-04-29 RX ADMIN — Medication 3 MILLIGRAM(S): at 23:13

## 2025-04-29 RX ADMIN — Medication 20 MILLIGRAM(S): at 14:10

## 2025-04-29 RX ADMIN — Medication 1 MILLIGRAM(S): at 00:55

## 2025-04-29 RX ADMIN — Medication 1 MILLIGRAM(S): at 15:14

## 2025-04-29 RX ADMIN — HYDROXYZINE HYDROCHLORIDE 50 MILLIGRAM(S): 25 TABLET, FILM COATED ORAL at 14:10

## 2025-04-29 RX ADMIN — Medication 1 MILLIGRAM(S): at 22:59

## 2025-04-29 RX ADMIN — Medication 1 MILLIGRAM(S): at 11:39

## 2025-04-29 RX ADMIN — Medication 1 MILLIGRAM(S): at 18:18

## 2025-04-29 RX ADMIN — OXYCODONE HYDROCHLORIDE 5 MILLIGRAM(S): 30 TABLET ORAL at 12:16

## 2025-04-29 RX ADMIN — OXYCODONE HYDROCHLORIDE 5 MILLIGRAM(S): 30 TABLET ORAL at 20:10

## 2025-04-29 RX ADMIN — Medication 1 APPLICATION(S): at 05:34

## 2025-04-29 RX ADMIN — Medication 1 MILLIGRAM(S): at 09:34

## 2025-04-29 RX ADMIN — Medication 1 MILLIGRAM(S): at 01:35

## 2025-04-29 RX ADMIN — Medication 650 MILLIGRAM(S): at 08:50

## 2025-04-29 RX ADMIN — SODIUM CHLORIDE 200 MILLILITER(S): 9 INJECTION, SOLUTION INTRAVENOUS at 06:01

## 2025-04-29 RX ADMIN — Medication 2 MILLIGRAM(S): at 08:48

## 2025-04-29 RX ADMIN — Medication 80 MILLIGRAM(S): at 23:13

## 2025-04-29 RX ADMIN — Medication 1 MILLIGRAM(S): at 14:11

## 2025-04-29 RX ADMIN — HYDROXYZINE HYDROCHLORIDE 50 MILLIGRAM(S): 25 TABLET, FILM COATED ORAL at 20:10

## 2025-04-29 RX ADMIN — Medication 80 MILLIGRAM(S): at 10:23

## 2025-04-29 RX ADMIN — Medication 2 MILLIGRAM(S): at 05:34

## 2025-04-29 RX ADMIN — SODIUM CHLORIDE 200 MILLILITER(S): 9 INJECTION, SOLUTION INTRAVENOUS at 10:17

## 2025-04-29 RX ADMIN — Medication 1 MILLIGRAM(S): at 22:19

## 2025-04-29 NOTE — CHART NOTE - NSCHARTNOTEFT_GEN_A_CORE
Per patient request for effectiveness, will make current dose of Ativan, IVP Patient requesting IV ativan but due to shortage, cannot switch at this time

## 2025-04-29 NOTE — PROGRESS NOTE ADULT - NS ATTEND AMEND GEN_ALL_CORE FT
40yo male with pancreatitis secondary to etoh gradually improving. Abd soft benign. Continue iv hydration, advance to low fat diet as tolerated. Discussed etoh cessation.

## 2025-04-30 LAB
ALBUMIN SERPL ELPH-MCNC: 3.9 G/DL — SIGNIFICANT CHANGE UP (ref 3.5–5.2)
ALP SERPL-CCNC: 54 U/L — SIGNIFICANT CHANGE UP (ref 30–115)
ALT FLD-CCNC: 36 U/L — SIGNIFICANT CHANGE UP (ref 0–41)
ANION GAP SERPL CALC-SCNC: 13 MMOL/L — SIGNIFICANT CHANGE UP (ref 7–14)
AST SERPL-CCNC: 34 U/L — SIGNIFICANT CHANGE UP (ref 0–41)
BASOPHILS # BLD AUTO: 0.06 K/UL — SIGNIFICANT CHANGE UP (ref 0–0.2)
BASOPHILS NFR BLD AUTO: 0.8 % — SIGNIFICANT CHANGE UP (ref 0–1)
BILIRUB SERPL-MCNC: <0.2 MG/DL — SIGNIFICANT CHANGE UP (ref 0.2–1.2)
BUN SERPL-MCNC: 7 MG/DL — LOW (ref 10–20)
CALCIUM SERPL-MCNC: 9.2 MG/DL — SIGNIFICANT CHANGE UP (ref 8.4–10.5)
CHLORIDE SERPL-SCNC: 101 MMOL/L — SIGNIFICANT CHANGE UP (ref 98–110)
CO2 SERPL-SCNC: 26 MMOL/L — SIGNIFICANT CHANGE UP (ref 17–32)
CREAT SERPL-MCNC: 0.8 MG/DL — SIGNIFICANT CHANGE UP (ref 0.7–1.5)
EGFR: 116 ML/MIN/1.73M2 — SIGNIFICANT CHANGE UP
EGFR: 116 ML/MIN/1.73M2 — SIGNIFICANT CHANGE UP
EOSINOPHIL # BLD AUTO: 0.31 K/UL — SIGNIFICANT CHANGE UP (ref 0–0.7)
EOSINOPHIL NFR BLD AUTO: 4.3 % — SIGNIFICANT CHANGE UP (ref 0–8)
GLUCOSE SERPL-MCNC: 112 MG/DL — HIGH (ref 70–99)
HCT VFR BLD CALC: 41.4 % — LOW (ref 42–52)
HGB BLD-MCNC: 13.9 G/DL — LOW (ref 14–18)
IMM GRANULOCYTES NFR BLD AUTO: 1.1 % — HIGH (ref 0.1–0.3)
LYMPHOCYTES # BLD AUTO: 1.96 K/UL — SIGNIFICANT CHANGE UP (ref 1.2–3.4)
LYMPHOCYTES # BLD AUTO: 27.2 % — SIGNIFICANT CHANGE UP (ref 20.5–51.1)
MCHC RBC-ENTMCNC: 32.6 PG — HIGH (ref 27–31)
MCHC RBC-ENTMCNC: 33.6 G/DL — SIGNIFICANT CHANGE UP (ref 32–37)
MCV RBC AUTO: 97.2 FL — HIGH (ref 80–94)
MONOCYTES # BLD AUTO: 1 K/UL — HIGH (ref 0.1–0.6)
MONOCYTES NFR BLD AUTO: 13.9 % — HIGH (ref 1.7–9.3)
NEUTROPHILS # BLD AUTO: 3.79 K/UL — SIGNIFICANT CHANGE UP (ref 1.4–6.5)
NEUTROPHILS NFR BLD AUTO: 52.7 % — SIGNIFICANT CHANGE UP (ref 42.2–75.2)
NRBC BLD AUTO-RTO: 0 /100 WBCS — SIGNIFICANT CHANGE UP (ref 0–0)
PLATELET # BLD AUTO: 247 K/UL — SIGNIFICANT CHANGE UP (ref 130–400)
PMV BLD: 9.3 FL — SIGNIFICANT CHANGE UP (ref 7.4–10.4)
POTASSIUM SERPL-MCNC: 4.1 MMOL/L — SIGNIFICANT CHANGE UP (ref 3.5–5)
POTASSIUM SERPL-SCNC: 4.1 MMOL/L — SIGNIFICANT CHANGE UP (ref 3.5–5)
PROT SERPL-MCNC: 5.5 G/DL — LOW (ref 6–8)
RBC # BLD: 4.26 M/UL — LOW (ref 4.7–6.1)
RBC # FLD: 12.6 % — SIGNIFICANT CHANGE UP (ref 11.5–14.5)
SODIUM SERPL-SCNC: 140 MMOL/L — SIGNIFICANT CHANGE UP (ref 135–146)
WBC # BLD: 7.2 K/UL — SIGNIFICANT CHANGE UP (ref 4.8–10.8)
WBC # FLD AUTO: 7.2 K/UL — SIGNIFICANT CHANGE UP (ref 4.8–10.8)

## 2025-04-30 PROCEDURE — 99231 SBSQ HOSP IP/OBS SF/LOW 25: CPT

## 2025-04-30 PROCEDURE — 99232 SBSQ HOSP IP/OBS MODERATE 35: CPT

## 2025-04-30 RX ORDER — KETOROLAC TROMETHAMINE 30 MG/ML
30 INJECTION, SOLUTION INTRAMUSCULAR; INTRAVENOUS EVERY 6 HOURS
Refills: 0 | Status: DISCONTINUED | OUTPATIENT
Start: 2025-04-30 | End: 2025-05-01

## 2025-04-30 RX ORDER — HYDROMORPHONE/SOD CHLOR,ISO/PF 2 MG/10 ML
2 SYRINGE (ML) INJECTION EVERY 6 HOURS
Refills: 0 | Status: DISCONTINUED | OUTPATIENT
Start: 2025-04-30 | End: 2025-05-01

## 2025-04-30 RX ORDER — NALTREXONE HYDROCHLORIDE 50 MG/1
50 TABLET, FILM COATED ORAL DAILY
Refills: 0 | Status: DISCONTINUED | OUTPATIENT
Start: 2025-04-30 | End: 2025-04-30

## 2025-04-30 RX ORDER — HYDROXYZINE HYDROCHLORIDE 25 MG/1
100 TABLET, FILM COATED ORAL EVERY 6 HOURS
Refills: 0 | Status: DISCONTINUED | OUTPATIENT
Start: 2025-04-30 | End: 2025-05-01

## 2025-04-30 RX ADMIN — Medication 80 MILLIGRAM(S): at 14:11

## 2025-04-30 RX ADMIN — Medication 1 MILLIGRAM(S): at 17:22

## 2025-04-30 RX ADMIN — SODIUM CHLORIDE 200 MILLILITER(S): 9 INJECTION, SOLUTION INTRAVENOUS at 11:21

## 2025-04-30 RX ADMIN — Medication 1 APPLICATION(S): at 06:51

## 2025-04-30 RX ADMIN — Medication 15 MILLIGRAM(S): at 09:43

## 2025-04-30 RX ADMIN — Medication 1 MILLIGRAM(S): at 15:46

## 2025-04-30 RX ADMIN — FOLIC ACID 1 MILLIGRAM(S): 1 TABLET ORAL at 11:21

## 2025-04-30 RX ADMIN — HYDROXYZINE HYDROCHLORIDE 50 MILLIGRAM(S): 25 TABLET, FILM COATED ORAL at 02:39

## 2025-04-30 RX ADMIN — POLYETHYLENE GLYCOL 3350 17 GRAM(S): 17 POWDER, FOR SOLUTION ORAL at 11:21

## 2025-04-30 RX ADMIN — Medication 15 MILLIGRAM(S): at 18:53

## 2025-04-30 RX ADMIN — Medication 2 MILLIGRAM(S): at 22:20

## 2025-04-30 RX ADMIN — HYDROXYZINE HYDROCHLORIDE 50 MILLIGRAM(S): 25 TABLET, FILM COATED ORAL at 09:44

## 2025-04-30 RX ADMIN — Medication 100 MILLIGRAM(S): at 11:21

## 2025-04-30 RX ADMIN — NALTREXONE HYDROCHLORIDE 50 MILLIGRAM(S): 50 TABLET, FILM COATED ORAL at 14:11

## 2025-04-30 RX ADMIN — Medication 10 MILLIGRAM(S): at 21:55

## 2025-04-30 RX ADMIN — Medication 25 MILLIGRAM(S): at 17:21

## 2025-04-30 RX ADMIN — Medication 25 MILLIGRAM(S): at 14:11

## 2025-04-30 RX ADMIN — Medication 3 MILLIGRAM(S): at 21:55

## 2025-04-30 RX ADMIN — HYDROXYZINE HYDROCHLORIDE 50 MILLIGRAM(S): 25 TABLET, FILM COATED ORAL at 15:46

## 2025-04-30 RX ADMIN — SODIUM CHLORIDE 200 MILLILITER(S): 9 INJECTION, SOLUTION INTRAVENOUS at 21:57

## 2025-04-30 RX ADMIN — HYDROXYZINE HYDROCHLORIDE 100 MILLIGRAM(S): 25 TABLET, FILM COATED ORAL at 18:53

## 2025-04-30 RX ADMIN — Medication 15 MILLIGRAM(S): at 06:51

## 2025-04-30 RX ADMIN — Medication 80 MILLIGRAM(S): at 21:56

## 2025-04-30 RX ADMIN — KETOROLAC TROMETHAMINE 30 MILLIGRAM(S): 30 INJECTION, SOLUTION INTRAMUSCULAR; INTRAVENOUS at 19:15

## 2025-04-30 RX ADMIN — Medication 1 MILLIGRAM(S): at 06:51

## 2025-04-30 RX ADMIN — Medication 1 MILLIGRAM(S): at 13:36

## 2025-04-30 RX ADMIN — SODIUM CHLORIDE 200 MILLILITER(S): 9 INJECTION, SOLUTION INTRAVENOUS at 03:32

## 2025-04-30 RX ADMIN — Medication 1 MILLIGRAM(S): at 11:21

## 2025-04-30 RX ADMIN — Medication 25 MILLIGRAM(S): at 06:55

## 2025-04-30 RX ADMIN — Medication 1 MILLIGRAM(S): at 02:39

## 2025-04-30 RX ADMIN — Medication 1 MILLIGRAM(S): at 03:32

## 2025-04-30 RX ADMIN — Medication 2 MILLIGRAM(S): at 21:56

## 2025-04-30 RX ADMIN — KETOROLAC TROMETHAMINE 30 MILLIGRAM(S): 30 INJECTION, SOLUTION INTRAMUSCULAR; INTRAVENOUS at 18:53

## 2025-04-30 RX ADMIN — Medication 25 MILLIGRAM(S): at 21:55

## 2025-04-30 RX ADMIN — Medication 20 MILLIGRAM(S): at 02:39

## 2025-04-30 RX ADMIN — Medication 15 MILLIGRAM(S): at 14:12

## 2025-04-30 NOTE — PROGRESS NOTE ADULT - ASSESSMENT
39-year-old male PMH alcohol abuse and pancreatitis presents for epigastric pain radiating to upper back    # ETOH pancreatitis   # Transaminitis - resolved   - lipase 399  -   - CT Abdomen and Pelvis w/ IV Cont (04.28.25 @ 05:34) >1.  Trace inflammatory change adjacent to the pancreatic tail, which can   be seen with acute pancreatitis. Recommend correlation with serum lipase   levels.  - IVF, Advance diet as tolerated   - repeat CT AP if no improvement in symptoms in next 24-48 hrs  - still reports 5-6 / 10 pain   - GI following     # H/O ETOH Abuse  # Suspected thiamine and folate deficiency  - addiction following - detox taper   - thiamine and folate replacement    dvt/ gi ppx/diet  dispo; acute  family discussion: wife at bedside, all questions answered and concerns addressed - 4/30 , agreeing with plan   
Patient is 40 y/o male with a PMHx of alcohol abuse and pancreatitis who presented to ED for abdominal pain. Patient notes pain started prior to presentation. Patient notes pain was 10/10, located in B/L upper abdomen and without alleviating factors at home.   Pain is similar to previous episodes of pancreatitis from a year ago.  States last drink was prior to hospital presentation consisting of 3 beers and a bottle of wine. Feels somewhat better since admission. Pain more controlled and in better spirits.    Pancreatitis  - Lipase was 299 on admission- No need to trend as clinically improving   - Gallbladder intact on CT and does not appear to be inflamed   - Obtain TG levels  - Likely from eTOH use  - Agree with LR as ordered  - Pain regimen as ordered  - Advance diet as tolerated to low fat  - LFT reassuring  - ETOH cessation advised  - Will follow

## 2025-05-01 ENCOUNTER — TRANSCRIPTION ENCOUNTER (OUTPATIENT)
Age: 40
End: 2025-05-01

## 2025-05-01 VITALS
DIASTOLIC BLOOD PRESSURE: 64 MMHG | OXYGEN SATURATION: 100 % | HEART RATE: 63 BPM | SYSTOLIC BLOOD PRESSURE: 116 MMHG | TEMPERATURE: 98 F

## 2025-05-01 LAB
ALBUMIN SERPL ELPH-MCNC: 4 G/DL — SIGNIFICANT CHANGE UP (ref 3.5–5.2)
ALP SERPL-CCNC: 58 U/L — SIGNIFICANT CHANGE UP (ref 30–115)
ALT FLD-CCNC: 36 U/L — SIGNIFICANT CHANGE UP (ref 0–41)
ANION GAP SERPL CALC-SCNC: 12 MMOL/L — SIGNIFICANT CHANGE UP (ref 7–14)
AST SERPL-CCNC: 31 U/L — SIGNIFICANT CHANGE UP (ref 0–41)
BASOPHILS # BLD AUTO: 0.06 K/UL — SIGNIFICANT CHANGE UP (ref 0–0.2)
BASOPHILS NFR BLD AUTO: 0.7 % — SIGNIFICANT CHANGE UP (ref 0–1)
BILIRUB SERPL-MCNC: 0.3 MG/DL — SIGNIFICANT CHANGE UP (ref 0.2–1.2)
BUN SERPL-MCNC: 10 MG/DL — SIGNIFICANT CHANGE UP (ref 10–20)
CALCIUM SERPL-MCNC: 9.5 MG/DL — SIGNIFICANT CHANGE UP (ref 8.4–10.5)
CHLORIDE SERPL-SCNC: 103 MMOL/L — SIGNIFICANT CHANGE UP (ref 98–110)
CO2 SERPL-SCNC: 26 MMOL/L — SIGNIFICANT CHANGE UP (ref 17–32)
CREAT SERPL-MCNC: 0.8 MG/DL — SIGNIFICANT CHANGE UP (ref 0.7–1.5)
EGFR: 116 ML/MIN/1.73M2 — SIGNIFICANT CHANGE UP
EGFR: 116 ML/MIN/1.73M2 — SIGNIFICANT CHANGE UP
EOSINOPHIL # BLD AUTO: 0.37 K/UL — SIGNIFICANT CHANGE UP (ref 0–0.7)
EOSINOPHIL NFR BLD AUTO: 4.3 % — SIGNIFICANT CHANGE UP (ref 0–8)
GLUCOSE SERPL-MCNC: 100 MG/DL — HIGH (ref 70–99)
HCT VFR BLD CALC: 42.7 % — SIGNIFICANT CHANGE UP (ref 42–52)
HGB BLD-MCNC: 14.4 G/DL — SIGNIFICANT CHANGE UP (ref 14–18)
IMM GRANULOCYTES NFR BLD AUTO: 0.8 % — HIGH (ref 0.1–0.3)
LYMPHOCYTES # BLD AUTO: 2.45 K/UL — SIGNIFICANT CHANGE UP (ref 1.2–3.4)
LYMPHOCYTES # BLD AUTO: 28.7 % — SIGNIFICANT CHANGE UP (ref 20.5–51.1)
MCHC RBC-ENTMCNC: 32.7 PG — HIGH (ref 27–31)
MCHC RBC-ENTMCNC: 33.7 G/DL — SIGNIFICANT CHANGE UP (ref 32–37)
MCV RBC AUTO: 97 FL — HIGH (ref 80–94)
MONOCYTES # BLD AUTO: 1 K/UL — HIGH (ref 0.1–0.6)
MONOCYTES NFR BLD AUTO: 11.7 % — HIGH (ref 1.7–9.3)
NEUTROPHILS # BLD AUTO: 4.6 K/UL — SIGNIFICANT CHANGE UP (ref 1.4–6.5)
NEUTROPHILS NFR BLD AUTO: 53.8 % — SIGNIFICANT CHANGE UP (ref 42.2–75.2)
NRBC BLD AUTO-RTO: 0 /100 WBCS — SIGNIFICANT CHANGE UP (ref 0–0)
PLATELET # BLD AUTO: 246 K/UL — SIGNIFICANT CHANGE UP (ref 130–400)
PMV BLD: 9.2 FL — SIGNIFICANT CHANGE UP (ref 7.4–10.4)
POTASSIUM SERPL-MCNC: 4.1 MMOL/L — SIGNIFICANT CHANGE UP (ref 3.5–5)
POTASSIUM SERPL-SCNC: 4.1 MMOL/L — SIGNIFICANT CHANGE UP (ref 3.5–5)
PROT SERPL-MCNC: 6 G/DL — SIGNIFICANT CHANGE UP (ref 6–8)
RBC # BLD: 4.4 M/UL — LOW (ref 4.7–6.1)
RBC # FLD: 12.6 % — SIGNIFICANT CHANGE UP (ref 11.5–14.5)
SODIUM SERPL-SCNC: 141 MMOL/L — SIGNIFICANT CHANGE UP (ref 135–146)
WBC # BLD: 8.55 K/UL — SIGNIFICANT CHANGE UP (ref 4.8–10.8)
WBC # FLD AUTO: 8.55 K/UL — SIGNIFICANT CHANGE UP (ref 4.8–10.8)

## 2025-05-01 PROCEDURE — 99231 SBSQ HOSP IP/OBS SF/LOW 25: CPT

## 2025-05-01 PROCEDURE — 99239 HOSP IP/OBS DSCHRG MGMT >30: CPT

## 2025-05-01 RX ORDER — NALTREXONE HYDROCHLORIDE 50 MG/1
1 TABLET, FILM COATED ORAL
Qty: 30 | Refills: 0
Start: 2025-05-01 | End: 2025-05-30

## 2025-05-01 RX ADMIN — Medication 80 MILLIGRAM(S): at 09:27

## 2025-05-01 RX ADMIN — Medication 10 MILLIGRAM(S): at 05:48

## 2025-05-01 RX ADMIN — HYDROXYZINE HYDROCHLORIDE 100 MILLIGRAM(S): 25 TABLET, FILM COATED ORAL at 09:27

## 2025-05-01 RX ADMIN — KETOROLAC TROMETHAMINE 30 MILLIGRAM(S): 30 INJECTION, SOLUTION INTRAMUSCULAR; INTRAVENOUS at 09:27

## 2025-05-01 RX ADMIN — SODIUM CHLORIDE 200 MILLILITER(S): 9 INJECTION, SOLUTION INTRAVENOUS at 09:26

## 2025-05-01 RX ADMIN — POLYETHYLENE GLYCOL 3350 17 GRAM(S): 17 POWDER, FOR SOLUTION ORAL at 09:26

## 2025-05-01 RX ADMIN — HYDROXYZINE HYDROCHLORIDE 100 MILLIGRAM(S): 25 TABLET, FILM COATED ORAL at 02:36

## 2025-05-01 RX ADMIN — KETOROLAC TROMETHAMINE 30 MILLIGRAM(S): 30 INJECTION, SOLUTION INTRAMUSCULAR; INTRAVENOUS at 02:59

## 2025-05-01 RX ADMIN — Medication 10 MILLIGRAM(S): at 10:13

## 2025-05-01 RX ADMIN — Medication 1 APPLICATION(S): at 05:48

## 2025-05-01 RX ADMIN — Medication 2 MILLIGRAM(S): at 05:48

## 2025-05-01 RX ADMIN — KETOROLAC TROMETHAMINE 30 MILLIGRAM(S): 30 INJECTION, SOLUTION INTRAMUSCULAR; INTRAVENOUS at 02:37

## 2025-05-01 RX ADMIN — Medication 2 MILLIGRAM(S): at 06:10

## 2025-05-01 RX ADMIN — Medication 25 MILLIGRAM(S): at 05:48

## 2025-05-01 RX ADMIN — Medication 10 MILLIGRAM(S): at 02:48

## 2025-05-01 NOTE — DISCHARGE NOTE PROVIDER - HOSPITAL COURSE
39-year-old male PMH alcohol abuse and pancreatitis presents for epigastric pain radiating to upper back    # ETOH pancreatitis   # Transaminitis - resolved   - CT Abdomen and Pelvis w/ IV Cont (04.28.25 @ 05:34) >1.  Trace inflammatory change adjacent to the pancreatic tail, which can   be seen with acute pancreatitis.    - IVF, Advanced diet as tolerated   - GI consulted, appreciated     # H/O ETOH Abuse  # Suspected thiamine and folate deficiency  - addiction medicine consulted: detox taper, pt will f/u with aftercare to [facility]   - thiamine and folate replacement given     Patient is medically cleared for discharge. 39-year-old male PMH alcohol abuse and pancreatitis presents for epigastric pain radiating to upper back. Was AFVSS. Lipase elevated and CT showing evidence of acute pancreatitis. Admitted to medicine for further management.     # ETOH pancreatitis   # Transaminitis - resolved   - Most likely in setting of alcohol use  - Triglycerides wnl  - Not on any medications at home  - Pain controlled with PO Dilaudid  - IVF, Advanced diet as tolerated   -- Able to tolerate PO with improved pain control by time of discharge  - counseled on alcohol cessation    # H/O ETOH Abuse  # Suspected thiamine and folate deficiency  - addiction medicine consulted: placed on detox librium taper, pt will f/u with aftercare to [facility]   - thiamine and folate replacement given     Patient is medically cleared for discharge.

## 2025-05-01 NOTE — DISCHARGE NOTE PROVIDER - NSDCMRMEDTOKEN_GEN_ALL_CORE_FT
naltrexone 50 mg oral tablet: 1 tab(s) orally once a day Start taking this medication Saturday, 5/3/25

## 2025-05-01 NOTE — PROGRESS NOTE ADULT - SUBJECTIVE AND OBJECTIVE BOX
Pt interviewed, examined and EMR chart reviewed.    Follow up of Alcohol Dependency. Pt is on Ativan CIWA protocol. Pt is doing better . Pt with complaint of feeling withdrawal when ativan wears off. pt received 7 doses in last 21 hours      REVIEW OF SYSTEMS:    Constitutional: No fever, weight loss or fatigue  ENT:  No difficulty hearing, tinnitus, vertigo; No sinus or throat pain  Neck: No pain or stiffness  Respiratory: No cough, wheezing, chills or hemoptysis  Cardiovascular: No chest pain, palpitations, shortness of breath, dizziness or leg swelling  Gastrointestinal: No abdominal or epigastric pain. No nausea, vomiting or hematemesis; No diarrhea or constipation. No melena or hematochezia.  Neurological: No headaches, memory loss, loss of strength, numbness or tremors  Musculoskeletal: No joint pain or swelling; No muscle, back or extremity pain      MEDICATIONS  (STANDING):  acetaminophen     Tablet .. 650 milliGRAM(s) Oral every 6 hours  chlordiazePOXIDE   Oral   chlorhexidine 2% Cloths 1 Application(s) Topical <User Schedule>  folic acid 1 milliGRAM(s) Oral daily  influenza   Vaccine 0.5 milliLiter(s) IntraMuscular once  lactated ringers. 1000 milliLiter(s) (200 mL/Hr) IV Continuous <Continuous>  thiamine 100 milliGRAM(s) Oral daily    MEDICATIONS  (PRN):  chlordiazePOXIDE 25 milliGRAM(s) Oral every 4 hours PRN Alcohol Withdrawal Symptoms  HYDROmorphone  Injectable 1 milliGRAM(s) IV Push every 4 hours PRN Severe Pain (7 - 10)  hydrOXYzine hydrochloride 50 milliGRAM(s) Oral every 6 hours PRN Anxiety  melatonin 3 milliGRAM(s) Oral at bedtime PRN Sleep  ondansetron Injectable 4 milliGRAM(s) IV Push every 6 hours PRN Nausea and/or Vomiting  polyethylene glycol 3350 17 Gram(s) Oral two times a day PRN Constipation  simethicone 80 milliGRAM(s) Chew two times a day PRN Gas      Vital Signs Last 24 Hrs  T(C): 36.4 (29 Apr 2025 05:52), Max: 36.7 (28 Apr 2025 14:08)  T(F): 97.6 (29 Apr 2025 05:52), Max: 98 (28 Apr 2025 14:08)  HR: 78 (29 Apr 2025 05:52) (78 - 92)  BP: 136/83 (29 Apr 2025 05:52) (134/82 - 144/69)  BP(mean): --  RR: 17 (28 Apr 2025 20:49) (16 - 18)  SpO2: 96% (29 Apr 2025 05:52) (95% - 97%)    Parameters below as of 29 Apr 2025 05:52  Patient On (Oxygen Delivery Method): room air        PHYSICAL EXAM:    Constitutional: NAD, well-groomed, well-developed  HEENT: PERRLA, EOMI, Normal Hearing,   Neck: No LAD, No JVD  Back: Normal spine flexure, No CVA tenderness  Respiratory: CTAB/L  Cardiovascular: S1 and S2, RRR, no M/G/R  Gastrointestinal: BS+, soft, NT/ND  Extremities: No peripheral edema  Neurological: A/O x 3, no focal deficits mild tremors    LABS:                        14.5   7.79  )-----------( 223      ( 29 Apr 2025 07:03 )             42.4     04-29    139  |  103  |  7[L]  ----------------------------<  103[H]  4.1   |  27  |  0.7    Ca    9.0      29 Apr 2025 07:03  Mg     2.1     04-29    TPro  5.6[L]  /  Alb  4.1  /  TBili  0.3  /  DBili  x   /  AST  40  /  ALT  38  /  AlkPhos  52  04-29      Urinalysis Basic - ( 29 Apr 2025 07:03 )    Color: x / Appearance: x / SG: x / pH: x  Gluc: 103 mg/dL / Ketone: x  / Bili: x / Urobili: x   Blood: x / Protein: x / Nitrite: x   Leuk Esterase: x / RBC: x / WBC x   Sq Epi: x / Non Sq Epi: x / Bacteria: x      Drug Screen Urine:  Alcohol Level  Alcohol, Blood: <10 mg/dL (04-28-25 @ 06:40)        RADIOLOGY & ADDITIONAL STUDIES:  
SUBJECTIVE:    Patient is a 39y old Male who presents with a chief complaint of pancreatitis (28 Apr 2025 14:16)    Currently admitted to medicine with the primary diagnosis of Acute pancreatitis       Today is hospital day . This morning he is resting comfortably in bed and reports no new issues or overnight events. reports epig abd pain some improvment but notes received pain med prior to my exam         PAST MEDICAL & SURGICAL HISTORY  ETOH abuse    Pancreatitis    No significant past surgical history      SOCIAL HISTORY:    ALLERGIES:  No Known Allergies    MEDICATIONS:  STANDING MEDICATIONS  acetaminophen     Tablet .. 650 milliGRAM(s) Oral every 6 hours  chlorhexidine 2% Cloths 1 Application(s) Topical <User Schedule>  folic acid 1 milliGRAM(s) Oral daily  influenza   Vaccine 0.5 milliLiter(s) IntraMuscular once  lactated ringers. 1000 milliLiter(s) IV Continuous <Continuous>  thiamine 100 milliGRAM(s) Oral daily    PRN MEDICATIONS  HYDROmorphone  Injectable 1 milliGRAM(s) IV Push every 4 hours PRN  hydrOXYzine hydrochloride 50 milliGRAM(s) Oral every 6 hours PRN  LORazepam     Tablet 2 milliGRAM(s) Oral every 2 hours PRN  melatonin 3 milliGRAM(s) Oral at bedtime PRN  ondansetron Injectable 4 milliGRAM(s) IV Push every 6 hours PRN  polyethylene glycol 3350 17 Gram(s) Oral two times a day PRN  simethicone 80 milliGRAM(s) Chew two times a day PRN    VITALS:   T(F): 98  HR: 81  BP: 134/82  RR: 16  SpO2: 97%    LABS:  Negative for smoking/alcohol/drug use.                         15.6   12.17 )-----------( 286      ( 28 Apr 2025 05:21 )             46.5     04-28    140  |  96[L]  |  22[H]  ----------------------------<  86  4.8   |  32  |  0.8    Ca    9.4      28 Apr 2025 05:21    TPro  6.6  /  Alb  4.6  /  TBili  <0.2  /  DBili  x   /  AST  49[H]  /  ALT  51[H]  /  AlkPhos  83  04-28      Urinalysis Basic - ( 28 Apr 2025 05:21 )    Color: x / Appearance: x / SG: x / pH: x  Gluc: 86 mg/dL / Ketone: x  / Bili: x / Urobili: x   Blood: x / Protein: x / Nitrite: x   Leuk Esterase: x / RBC: x / WBC x   Sq Epi: x / Non Sq Epi: x / Bacteria: x        Lactate, Blood: 1.0 mmol/L (04-28-25 @ 05:21)          RADIOLOGY:    PHYSICAL EXAM:  GEN: No acute distress  HEENT: normocephalic, atraumatic, aniceteric  LUNGS: bl breath sounds   HEART: S1/S2 present. RRR, no murmurs  ABD: Soft, epigastric tenderness , non-distended. Bowel sounds present  EXT: warm   NEURO: AAOX3, normal affect      ASSESSMENT AND PLAN:    39-year-old male PMH alcohol abuse and pancreatitis presents for epigastric pain radiating to upper back    # ETOH pancreatitis   - lipase 399   -   -CT Abdomen and Pelvis w/ IV Cont (04.28.25 @ 05:34) >1.  Trace inflammatory change adjacent to the pancreatic tail, which can   be seen with acute pancreatitis. Recommend correlation with serum lipase   levels.  - IVF, Advance diet as tolerated, pain control   - GI EVAL   - fu LFT's       # H/O ETOH Abuse  # Suspected thiamine and folate deficiency  - addiction following  - thiamine and folate replacement    dvt/ gi ppx/diet  dispo; acute  family discussion: wife at bedside, all questions answered and concerns addressed - 4/28 , agreeing with plan     
    Pt interviewed, examined and EMR chart reviewed.    Follow up of Alcohol Dependency. Pt is on Librium protocol. Pt is doing better. Pt with no complaint of withdrawal or pain. Pt wants to leave today. Pt felt some anxiety and headache yesterday.      REVIEW OF SYSTEMS:    Constitutional: No fever, weight loss or fatigue  ENT:  No difficulty hearing, tinnitus, vertigo; No sinus or throat pain  Neck: No pain or stiffness  Respiratory: No cough, wheezing, chills or hemoptysis  Cardiovascular: No chest pain, palpitations, shortness of breath, dizziness or leg swelling  Gastrointestinal: No abdominal or epigastric pain. No nausea, vomiting or hematemesis; No diarrhea or constipation. No melena or hematochezia.  Neurological: No headaches, memory loss, loss of strength, numbness or tremors  Musculoskeletal: No joint pain or swelling; No muscle, back or extremity pain      MEDICATIONS  (STANDING):  chlordiazePOXIDE 10 milliGRAM(s) Oral every 4 hours  chlordiazePOXIDE   Oral   chlorhexidine 2% Cloths 1 Application(s) Topical <User Schedule>  folic acid 1 milliGRAM(s) Oral daily  influenza   Vaccine 0.5 milliLiter(s) IntraMuscular once  lactated ringers. 1000 milliLiter(s) (200 mL/Hr) IV Continuous <Continuous>    MEDICATIONS  (PRN):  chlordiazePOXIDE 25 milliGRAM(s) Oral every 4 hours PRN Alcohol Withdrawal Symptoms  diphenhydrAMINE 25 milliGRAM(s) Oral every 6 hours PRN Rash and/or Itching  HYDROmorphone  Injectable 2 milliGRAM(s) IV Push every 6 hours PRN Severe Pain (7 - 10)  hydrOXYzine hydrochloride 100 milliGRAM(s) Oral every 6 hours PRN Anxiety  ketorolac   Injectable 30 milliGRAM(s) IV Push every 6 hours PRN Moderate Pain (4 - 6)  melatonin 3 milliGRAM(s) Oral at bedtime PRN Sleep  ondansetron Injectable 4 milliGRAM(s) IV Push every 6 hours PRN Nausea and/or Vomiting  polyethylene glycol 3350 17 Gram(s) Oral two times a day PRN Constipation  simethicone 80 milliGRAM(s) Chew two times a day PRN Gas      Vital Signs Last 24 Hrs  T(C): 36.4 (01 May 2025 04:53), Max: 37 (30 Apr 2025 13:59)  T(F): 97.6 (01 May 2025 04:53), Max: 98.6 (30 Apr 2025 13:59)  HR: 63 (01 May 2025 04:53) (63 - 83)  BP: 116/64 (01 May 2025 04:53) (103/62 - 146/72)  BP(mean): --  RR: 24 (30 Apr 2025 15:50) (18 - 24)  SpO2: 100% (01 May 2025 04:53) (96% - 100%)    Parameters below as of 01 May 2025 04:53  Patient On (Oxygen Delivery Method): room air        PHYSICAL EXAM:    Constitutional: NAD, well-groomed, well-developed  HEENT: PERRLA, EOMI, Normal Hearing,   Neck: No LAD, No JVD  Back: Normal spine flexure, No CVA tenderness  Respiratory: CTAB/L  Cardiovascular: S1 and S2, RRR, no M/G/R  Gastrointestinal: BS+, soft, NT/ND  Extremities: No peripheral edema  Neurological: A/O x 3, no focal deficits    LABS:                        14.4   8.55  )-----------( 246      ( 01 May 2025 06:15 )             42.7     05-01    141  |  103  |  10  ----------------------------<  100[H]  4.1   |  26  |  0.8    Ca    9.5      01 May 2025 06:15    TPro  6.0  /  Alb  4.0  /  TBili  0.3  /  DBili  x   /  AST  31  /  ALT  36  /  AlkPhos  58  05-01      Urinalysis Basic - ( 01 May 2025 06:15 )    Color: x / Appearance: x / SG: x / pH: x  Gluc: 100 mg/dL / Ketone: x  / Bili: x / Urobili: x   Blood: x / Protein: x / Nitrite: x   Leuk Esterase: x / RBC: x / WBC x   Sq Epi: x / Non Sq Epi: x / Bacteria: x      Drug Screen Urine:  Alcohol Level        RADIOLOGY & ADDITIONAL STUDIES:  
39-year-old male PMH alcohol abuse and pancreatitis presents for epigastric pain radiating to upper back    Today:  Seen at bedside, states his abdominal pain is improving, would like to try to advance his diet.              REVIEW OF SYSTEMS:  Abdominal pain    MEDICATIONS  (STANDING):  chlordiazePOXIDE 15 milliGRAM(s) Oral every 4 hours  chlordiazePOXIDE 10 milliGRAM(s) Oral every 4 hours  chlordiazePOXIDE   Oral   chlorhexidine 2% Cloths 1 Application(s) Topical <User Schedule>  folic acid 1 milliGRAM(s) Oral daily  influenza   Vaccine 0.5 milliLiter(s) IntraMuscular once  lactated ringers. 1000 milliLiter(s) (200 mL/Hr) IV Continuous <Continuous>    MEDICATIONS  (PRN):  chlordiazePOXIDE 25 milliGRAM(s) Oral every 4 hours PRN Alcohol Withdrawal Symptoms  diphenhydrAMINE 25 milliGRAM(s) Oral every 6 hours PRN Rash and/or Itching  HYDROmorphone  Injectable 1 milliGRAM(s) IV Push every 4 hours PRN Severe Pain (7 - 10)  hydrOXYzine hydrochloride 50 milliGRAM(s) Oral every 6 hours PRN Anxiety  melatonin 3 milliGRAM(s) Oral at bedtime PRN Sleep  ondansetron Injectable 4 milliGRAM(s) IV Push every 6 hours PRN Nausea and/or Vomiting  oxycodone    5 mG/acetaminophen 325 mG 2 Tablet(s) Oral every 6 hours PRN Moderate Pain (4 - 6)  oxyCODONE    IR 5 milliGRAM(s) Oral three times a day PRN Moderate Pain (4 - 6)  polyethylene glycol 3350 17 Gram(s) Oral two times a day PRN Constipation  simethicone 80 milliGRAM(s) Chew two times a day PRN Gas      Allergies  No Known Allergies        FAMILY HISTORY:  Family history of pancreatitis (Sibling)        Vital Signs Last 24 Hrs  T(C): 37 (30 Apr 2025 13:59), Max: 37 (30 Apr 2025 13:59)  T(F): 98.6 (30 Apr 2025 13:59), Max: 98.6 (30 Apr 2025 13:59)  HR: 75 (30 Apr 2025 13:59) (75 - 81)  BP: 108/63 (30 Apr 2025 13:59) (108/63 - 144/67)  RR: 18 (30 Apr 2025 13:59) (18 - 18)  SpO2: 97% (30 Apr 2025 13:59) (96% - 97%)    Parameters below as of 30 Apr 2025 05:18  Patient On (Oxygen Delivery Method): room air        PHYSICAL EXAM:  GEN: No acute distress  HEENT: normocephalic, atraumatic, aniceteric  LUNGS: bl breath sounds   HEART: S1/S2 present. RRR, no murmurs  ABD: Soft, mild tender epigastric , non-distended. Bowel sounds present  EXT: warm   NEURO: AAOX3, normal affect      LABS:                        13.9   7.20  )-----------( 247      ( 30 Apr 2025 08:58 )             41.4     04-30    140  |  101  |  7[L]  ----------------------------<  112[H]  4.1   |  26  |  0.8    Ca    9.2      30 Apr 2025 08:58  Mg     2.1     04-29    TPro  5.5[L]  /  Alb  3.9  /  TBili  <0.2  /  DBili  x   /  AST  34  /  ALT  36  /  AlkPhos  54  04-30      Urinalysis Basic - ( 30 Apr 2025 08:58 )    Color: x / Appearance: x / SG: x / pH: x  Gluc: 112 mg/dL / Ketone: x  / Bili: x / Urobili: x   Blood: x / Protein: x / Nitrite: x   Leuk Esterase: x / RBC: x / WBC x   Sq Epi: x / Non Sq Epi: x / Bacteria: x      
SUBJECTIVE:    Patient is a 39y old Male who presents with a chief complaint of pancreatitis (29 Apr 2025 09:41)    Currently admitted to medicine with the primary diagnosis of Acute pancreatitis       Today is hospital day 1d. This morning he is resting comfortably in bed and reports no new issues or overnight events. reports abdominal pain without much improvement from day prior 5-6 / 10 without pain medicine       PAST MEDICAL & SURGICAL HISTORY  ETOH abuse    Pancreatitis    No significant past surgical history      SOCIAL HISTORY:    ALLERGIES:  No Known Allergies    MEDICATIONS:  STANDING MEDICATIONS  chlordiazePOXIDE 20 milliGRAM(s) Oral every 4 hours  chlordiazePOXIDE   Oral   chlorhexidine 2% Cloths 1 Application(s) Topical <User Schedule>  folic acid 1 milliGRAM(s) Oral daily  influenza   Vaccine 0.5 milliLiter(s) IntraMuscular once  lactated ringers. 1000 milliLiter(s) IV Continuous <Continuous>  thiamine 100 milliGRAM(s) Oral daily    PRN MEDICATIONS  chlordiazePOXIDE 25 milliGRAM(s) Oral every 4 hours PRN  diphenhydrAMINE 25 milliGRAM(s) Oral every 6 hours PRN  HYDROmorphone  Injectable 1 milliGRAM(s) IV Push every 4 hours PRN  hydrOXYzine hydrochloride 50 milliGRAM(s) Oral every 6 hours PRN  melatonin 3 milliGRAM(s) Oral at bedtime PRN  ondansetron Injectable 4 milliGRAM(s) IV Push every 6 hours PRN  oxycodone    5 mG/acetaminophen 325 mG 2 Tablet(s) Oral every 6 hours PRN  oxyCODONE    IR 5 milliGRAM(s) Oral three times a day PRN  polyethylene glycol 3350 17 Gram(s) Oral two times a day PRN  simethicone 80 milliGRAM(s) Chew two times a day PRN    VITALS:   T(F): 97.6  HR: 78  BP: 136/83  RR: 17  SpO2: 96%    LABS:  Negative for smoking/alcohol/drug use.                         14.5   7.79  )-----------( 223      ( 29 Apr 2025 07:03 )             42.4     04-29    139  |  103  |  7[L]  ----------------------------<  103[H]  4.1   |  27  |  0.7    Ca    9.0      29 Apr 2025 07:03  Mg     2.1     04-29    TPro  5.6[L]  /  Alb  4.1  /  TBili  0.3  /  DBili  x   /  AST  40  /  ALT  38  /  AlkPhos  52  04-29      Urinalysis Basic - ( 29 Apr 2025 07:03 )    Color: x / Appearance: x / SG: x / pH: x  Gluc: 103 mg/dL / Ketone: x  / Bili: x / Urobili: x   Blood: x / Protein: x / Nitrite: x   Leuk Esterase: x / RBC: x / WBC x   Sq Epi: x / Non Sq Epi: x / Bacteria: x                RADIOLOGY:    PHYSICAL EXAM:  GEN: No acute distress  HEENT: normocephalic, atraumatic, aniceteric  LUNGS: bl breath sounds   HEART: S1/S2 present. RRR, no murmurs  ABD: Soft, mild tender epigastric , non-distended. Bowel sounds present  EXT: warm   NEURO: AAOX3, normal affect      ASSESSMENT AND PLAN:    39-year-old male PMH alcohol abuse and pancreatitis presents for epigastric pain radiating to upper back    # ETOH pancreatitis   # Transaminitis - resolved   - lipase 399 > lipase 799 (done per request despite education of no clinical relevance)   - TG fu   - CT Abdomen and Pelvis w/ IV Cont (04.28.25 @ 05:34) >1.  Trace inflammatory change adjacent to the pancreatic tail, which can   be seen with acute pancreatitis. Recommend correlation with serum lipase   levels.  - IVF, Advance diet as tolerated to low fat , pain control   - repeat CT AP if no improvement in symptoms in next 24-48 hrs  - still reports 5-6 / 10 pain   - GI following     # H/O ETOH Abuse  # Suspected thiamine and folate deficiency  - addiction following - detox taper   - thiamine and folate replacement    dvt/ gi ppx/diet  dispo; acute  family discussion: wife at bedside, all questions answered and concerns addressed - 4/29 , agreeing with plan   
Patient is 38 y/o male with a PMHx of alcohol abuse and pancreatitis who presented to ED for abdominal pain. Patient notes pain started prior to presentation. Patient notes pain was 10/10, located in B/L upper abdomen and without alleviating factors at home.   Pain is similar to previous episodes of pancreatitis from a year ago.  States last drink was prior to hospital presentation consisting of 3 beers and a bottle of wine. Feels somewhat better since admission. Pain more controlled and in better spirits.     PAST MEDICAL & SURGICAL HISTORY:  ETOH abuse  Pancreatitis      MEDICATIONS  (STANDING):  acetaminophen     Tablet .. 650 milliGRAM(s) Oral every 6 hours  chlorhexidine 2% Cloths 1 Application(s) Topical <User Schedule>  folic acid 1 milliGRAM(s) Oral daily  influenza   Vaccine 0.5 milliLiter(s) IntraMuscular once  lactated ringers. 1000 milliLiter(s) (200 mL/Hr) IV Continuous <Continuous>  thiamine 100 milliGRAM(s) Oral daily    MEDICATIONS  (PRN):  HYDROmorphone  Injectable 1 milliGRAM(s) IV Push every 6 hours PRN Severe Pain (7 - 10)  hydrOXYzine hydrochloride 50 milliGRAM(s) Oral every 6 hours PRN Anxiety  LORazepam     Tablet 2 milliGRAM(s) Oral every 2 hours PRN CIWA-Ar score  8 - 15  melatonin 3 milliGRAM(s) Oral at bedtime PRN Sleep  ondansetron Injectable 4 milliGRAM(s) IV Push every 6 hours PRN Nausea and/or Vomiting  polyethylene glycol 3350 17 Gram(s) Oral two times a day PRN Constipation  simethicone 80 milliGRAM(s) Chew two times a day PRN Gas      Allergies  No Known Allergies      Review of Systems  General:  Denies Fatigue, Denies Fever, Denies Weakness ,Denies Weight Loss   HEENT: Denies Trouble Swallowing ,Denies  Sore Throat , Denies Change in hearing/vision/speech ,Denies Dizziness    Cardio: Denies  Chest Pain , Palpitations    Respiratory: Denies worsening of SOB, Denies Cough  Abdomen: See detailed HPI  Neuro: Denies Headache Denies Dizziness, Denies Paresthesias  MSK: Denies pain in Bones/Joints/Muscles   Psych: Patient denies depression, denies suicidal or homicidal ideations  Integ: Patient Denies rash, or new skin lesions       Vital Signs Last 24 Hrs  T(C): 36.4 (29 Apr 2025 05:52), Max: 36.7 (28 Apr 2025 14:08)  T(F): 97.6 (29 Apr 2025 05:52), Max: 98 (28 Apr 2025 14:08)  HR: 78 (29 Apr 2025 05:52) (78 - 92)  BP: 136/83 (29 Apr 2025 05:52) (134/82 - 144/69)  BP(mean): --  RR: 17 (28 Apr 2025 20:49) (16 - 18)  SpO2: 96% (29 Apr 2025 05:52) (95% - 97%)    Parameters below as of 29 Apr 2025 05:52  Patient On (Oxygen Delivery Method): room air      PHYSICAL EXAM:  GENERAL: NAD, well-appearing  CHEST/LUNG: Clear to auscultation bilaterally  HEART: Regular rate and rhythm  ABDOMEN: Soft, ND, NT  EXTREMITIES:  No clubbing, cyanosis, or edema        Labs:                        14.5   7.79  )-----------( 223      ( 29 Apr 2025 07:03 )             42.4     04-29    139  |  103  |  7[L]  ----------------------------<  103[H]  4.1   |  27  |  0.7    Ca    9.0      29 Apr 2025 07:03  Mg     2.1     04-29    TPro  5.6[L]  /  Alb  4.1  /  TBili  0.3  /  DBili  x   /  AST  40  /  ALT  38  /  AlkPhos  52  04-29                 RADIOLOGY & ADDITIONAL STUDIES:  CT Abdomen and Pelvis w/ IV Cont 04.28.25  IMPRESSION:  1.  Trace inflammatory change adjacent to the pancreatic tail, which can   be seen with acute pancreatitis. Recommend correlation with serum lipase   levels.      
    Pt interviewed, examined and EMR chart reviewed.    Follow up of Alcohol Dependency. Pt is on Librium protocol. Pt is doing better . Pt with no complaint of withdrawal      REVIEW OF SYSTEMS:    Constitutional: No fever, weight loss or fatigue  ENT:  No difficulty hearing, tinnitus, vertigo; No sinus or throat pain  Neck: No pain or stiffness  Respiratory: No cough, wheezing, chills or hemoptysis  Cardiovascular: No chest pain, palpitations, shortness of breath, dizziness or leg swelling  Gastrointestinal: No abdominal or epigastric pain. No nausea, vomiting or hematemesis; No diarrhea or constipation. No melena or hematochezia.  Neurological: No headaches, memory loss, loss of strength, numbness or tremors  Musculoskeletal: No joint pain or swelling; No muscle, back or extremity pain      MEDICATIONS  (STANDING):  chlordiazePOXIDE   Oral   chlordiazePOXIDE 15 milliGRAM(s) Oral every 4 hours  chlordiazePOXIDE 10 milliGRAM(s) Oral every 4 hours  chlorhexidine 2% Cloths 1 Application(s) Topical <User Schedule>  folic acid 1 milliGRAM(s) Oral daily  influenza   Vaccine 0.5 milliLiter(s) IntraMuscular once  lactated ringers. 1000 milliLiter(s) (200 mL/Hr) IV Continuous <Continuous>  thiamine 100 milliGRAM(s) Oral daily    MEDICATIONS  (PRN):  chlordiazePOXIDE 25 milliGRAM(s) Oral every 4 hours PRN Alcohol Withdrawal Symptoms  diphenhydrAMINE 25 milliGRAM(s) Oral every 6 hours PRN Rash and/or Itching  HYDROmorphone  Injectable 1 milliGRAM(s) IV Push every 4 hours PRN Severe Pain (7 - 10)  hydrOXYzine hydrochloride 50 milliGRAM(s) Oral every 6 hours PRN Anxiety  melatonin 3 milliGRAM(s) Oral at bedtime PRN Sleep  ondansetron Injectable 4 milliGRAM(s) IV Push every 6 hours PRN Nausea and/or Vomiting  oxycodone    5 mG/acetaminophen 325 mG 2 Tablet(s) Oral every 6 hours PRN Moderate Pain (4 - 6)  oxyCODONE    IR 5 milliGRAM(s) Oral three times a day PRN Moderate Pain (4 - 6)  polyethylene glycol 3350 17 Gram(s) Oral two times a day PRN Constipation  simethicone 80 milliGRAM(s) Chew two times a day PRN Gas      Vital Signs Last 24 Hrs  T(C): 36.4 (30 Apr 2025 05:18), Max: 36.8 (29 Apr 2025 20:24)  T(F): 97.6 (30 Apr 2025 05:18), Max: 98.3 (29 Apr 2025 20:24)  HR: 75 (30 Apr 2025 05:18) (75 - 90)  BP: 120/64 (30 Apr 2025 05:18) (120/64 - 144/67)  BP(mean): --  RR: 18 (29 Apr 2025 20:24) (17 - 18)  SpO2: 96% (30 Apr 2025 05:18) (95% - 97%)    Parameters below as of 30 Apr 2025 05:18  Patient On (Oxygen Delivery Method): room air        PHYSICAL EXAM:    Constitutional: NAD, well-groomed, well-developed  HEENT: PERRLA, EOMI, Normal Hearing,   Neck: No LAD, No JVD  Back: Normal spine flexure, No CVA tenderness  Respiratory: CTAB/L  Cardiovascular: S1 and S2, RRR, no M/G/R  Gastrointestinal: BS+, soft, NT/ND  Extremities: No peripheral edema  Neurological: A/O x 3, no focal deficits    LABS:                        13.9   7.20  )-----------( 247      ( 30 Apr 2025 08:58 )             41.4     04-29    139  |  103  |  7[L]  ----------------------------<  103[H]  4.1   |  27  |  0.7    Ca    9.0      29 Apr 2025 07:03  Mg     2.1     04-29    TPro  5.6[L]  /  Alb  4.1  /  TBili  0.3  /  DBili  x   /  AST  40  /  ALT  38  /  AlkPhos  52  04-29      Urinalysis Basic - ( 29 Apr 2025 07:03 )    Color: x / Appearance: x / SG: x / pH: x  Gluc: 103 mg/dL / Ketone: x  / Bili: x / Urobili: x   Blood: x / Protein: x / Nitrite: x   Leuk Esterase: x / RBC: x / WBC x   Sq Epi: x / Non Sq Epi: x / Bacteria: x      Drug Screen Urine:  Alcohol Level  Alcohol, Blood: <10 mg/dL (04-28-25 @ 06:40)        RADIOLOGY & ADDITIONAL STUDIES:

## 2025-05-01 NOTE — PROGRESS NOTE ADULT - PROBLEM SELECTOR PLAN 1
After evaluation at this time continue current protocol. Pts concerns addressed  Pt will be monitored and supportive care provided.  No other changes to medical care plan for withdrawals.  Monitor labs/electrolytes as needed.    -Counseling provided   CATCH team involved for aftercare and pt will follow up with aftercare. to [facility]    PT will restart naltrexone as outpatient
After evaluation at this time continue current protocol. Pts concerns addressed  Pt will be monitored and supportive care provided.  No other changes to medical care plan for withdrawals.  Monitor labs/electrolytes as needed.    P agreeable to start on naltrexone.     -Counseling provided   CATCH team involved for aftercare and pt will follow up with aftercare.
After evaluation at this time would stop CIWA ativan and transition to longer acting librium standing protocol. Pt with normal LFTs  Pts concerns addressed  Pt will be monitored and supportive care provided.  No other changes to medical care plan for withdrawals.  Monitor labs/electrolytes as needed.    -Counseling provided   CATCH team involved for aftercare and pt will follow up with aftercare. to outpatient    pt and wife in agreement. Dr. Lam aware.

## 2025-05-01 NOTE — DISCHARGE NOTE PROVIDER - NSDCCPCAREPLAN_GEN_ALL_CORE_FT
PRINCIPAL DISCHARGE DIAGNOSIS  Diagnosis: Acute pancreatitis  Assessment and Plan of Treatment: You were admitted to the hospital for acute pancreatitis secondary to alcohol use. A CT scan of you abdomen and pelvis was performed confirming your diagnosis of acute pancreatitis. You were started on IV fluids, made NPO (nothing by mouth) and your diet was advanced as tolerated. GI was consulted and recommend alcohol cessation to prevent relapse of pancreatitis and further complications of alcohol abuse. You should follow up with your primary care provider upon discharge.   If symptoms should worsen, it is recommended you go to the emergency room.

## 2025-05-01 NOTE — PROGRESS NOTE ADULT - PROVIDER SPECIALTY LIST ADULT
Hospitalist
Gastroenterology
Addiction Medicine

## 2025-05-01 NOTE — DISCHARGE NOTE NURSING/CASE MANAGEMENT/SOCIAL WORK - FINANCIAL ASSISTANCE
[facility] provides services at a reduced cost to those who are determined to be eligible through [facility]’s financial assistance program. Information regarding [facility]’s financial assistance program can be found by going to [email or web address] or by calling [phone].

## 2025-05-01 NOTE — DISCHARGE NOTE NURSING/CASE MANAGEMENT/SOCIAL WORK - PATIENT PORTAL LINK FT
You can access the FollowMyHealth Patient Portal offered by [facility] by registering at the following website: [email or web address]. By joining [facility]’s FollowMyHealth portal, you will also be able to view your health information using other applications (apps) compatible with our system.

## 2025-05-06 DIAGNOSIS — E51.9 THIAMINE DEFICIENCY, UNSPECIFIED: ICD-10-CM

## 2025-05-06 DIAGNOSIS — E53.8 DEFICIENCY OF OTHER SPECIFIED B GROUP VITAMINS: ICD-10-CM

## 2025-05-06 DIAGNOSIS — K85.20 ALCOHOL INDUCED ACUTE PANCREATITIS WITHOUT NECROSIS OR INFECTION: ICD-10-CM

## 2025-05-06 DIAGNOSIS — R10.13 EPIGASTRIC PAIN: ICD-10-CM

## 2025-05-06 DIAGNOSIS — F10.20 ALCOHOL DEPENDENCE, UNCOMPLICATED: ICD-10-CM
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
## 2025-06-24 ENCOUNTER — NON-APPOINTMENT (OUTPATIENT)
Age: 40
End: 2025-06-24